# Patient Record
Sex: FEMALE | Race: WHITE | NOT HISPANIC OR LATINO | URBAN - METROPOLITAN AREA
[De-identification: names, ages, dates, MRNs, and addresses within clinical notes are randomized per-mention and may not be internally consistent; named-entity substitution may affect disease eponyms.]

---

## 2019-07-10 ENCOUNTER — INPATIENT (INPATIENT)
Facility: HOSPITAL | Age: 84
LOS: 2 days | Discharge: HOME | End: 2019-07-13
Attending: INTERNAL MEDICINE | Admitting: INTERNAL MEDICINE
Payer: MEDICARE

## 2019-07-10 VITALS
DIASTOLIC BLOOD PRESSURE: 57 MMHG | RESPIRATION RATE: 20 BRPM | TEMPERATURE: 98 F | OXYGEN SATURATION: 93 % | SYSTOLIC BLOOD PRESSURE: 92 MMHG | HEART RATE: 100 BPM

## 2019-07-10 DIAGNOSIS — Z90.710 ACQUIRED ABSENCE OF BOTH CERVIX AND UTERUS: Chronic | ICD-10-CM

## 2019-07-10 DIAGNOSIS — Z90.49 ACQUIRED ABSENCE OF OTHER SPECIFIED PARTS OF DIGESTIVE TRACT: Chronic | ICD-10-CM

## 2019-07-10 DIAGNOSIS — Z85.828 PERSONAL HISTORY OF OTHER MALIGNANT NEOPLASM OF SKIN: Chronic | ICD-10-CM

## 2019-07-10 LAB
ALBUMIN SERPL ELPH-MCNC: 4.1 G/DL — SIGNIFICANT CHANGE UP (ref 3.5–5.2)
ALP SERPL-CCNC: 68 U/L — SIGNIFICANT CHANGE UP (ref 30–115)
ALT FLD-CCNC: 21 U/L — SIGNIFICANT CHANGE UP (ref 0–41)
ANION GAP SERPL CALC-SCNC: 15 MMOL/L — HIGH (ref 7–14)
APPEARANCE UR: ABNORMAL
APTT BLD: 33.9 SEC — SIGNIFICANT CHANGE UP (ref 27–39.2)
AST SERPL-CCNC: 19 U/L — SIGNIFICANT CHANGE UP (ref 0–41)
BACTERIA # UR AUTO: ABNORMAL
BASOPHILS # BLD AUTO: 0.07 K/UL — SIGNIFICANT CHANGE UP (ref 0–0.2)
BASOPHILS NFR BLD AUTO: 0.8 % — SIGNIFICANT CHANGE UP (ref 0–1)
BILIRUB SERPL-MCNC: 0.3 MG/DL — SIGNIFICANT CHANGE UP (ref 0.2–1.2)
BILIRUB UR-MCNC: NEGATIVE — SIGNIFICANT CHANGE UP
BLD GP AB SCN SERPL QL: SIGNIFICANT CHANGE UP
BUN SERPL-MCNC: 39 MG/DL — HIGH (ref 10–20)
CALCIUM SERPL-MCNC: 9.6 MG/DL — SIGNIFICANT CHANGE UP (ref 8.5–10.1)
CHLORIDE SERPL-SCNC: 102 MMOL/L — SIGNIFICANT CHANGE UP (ref 98–110)
CO2 SERPL-SCNC: 25 MMOL/L — SIGNIFICANT CHANGE UP (ref 17–32)
COLOR SPEC: YELLOW — SIGNIFICANT CHANGE UP
CREAT SERPL-MCNC: 1.7 MG/DL — HIGH (ref 0.7–1.5)
DIFF PNL FLD: SIGNIFICANT CHANGE UP
EOSINOPHIL # BLD AUTO: 0.13 K/UL — SIGNIFICANT CHANGE UP (ref 0–0.7)
EOSINOPHIL NFR BLD AUTO: 1.4 % — SIGNIFICANT CHANGE UP (ref 0–8)
EPI CELLS # UR: 3 /HPF — SIGNIFICANT CHANGE UP (ref 0–5)
GLUCOSE SERPL-MCNC: 118 MG/DL — HIGH (ref 70–99)
GLUCOSE UR QL: NEGATIVE — SIGNIFICANT CHANGE UP
HCT VFR BLD CALC: 39.5 % — SIGNIFICANT CHANGE UP (ref 37–47)
HGB BLD-MCNC: 13 G/DL — SIGNIFICANT CHANGE UP (ref 12–16)
HYALINE CASTS # UR AUTO: 0 /LPF — SIGNIFICANT CHANGE UP (ref 0–7)
IMM GRANULOCYTES NFR BLD AUTO: 0.4 % — HIGH (ref 0.1–0.3)
INR BLD: 1 RATIO — SIGNIFICANT CHANGE UP (ref 0.65–1.3)
KETONES UR-MCNC: NEGATIVE — SIGNIFICANT CHANGE UP
LEUKOCYTE ESTERASE UR-ACNC: ABNORMAL
LYMPHOCYTES # BLD AUTO: 1.02 K/UL — LOW (ref 1.2–3.4)
LYMPHOCYTES # BLD AUTO: 11.1 % — LOW (ref 20.5–51.1)
MAGNESIUM SERPL-MCNC: 2 MG/DL — SIGNIFICANT CHANGE UP (ref 1.8–2.4)
MCHC RBC-ENTMCNC: 29.1 PG — SIGNIFICANT CHANGE UP (ref 27–31)
MCHC RBC-ENTMCNC: 32.9 G/DL — SIGNIFICANT CHANGE UP (ref 32–37)
MCV RBC AUTO: 88.4 FL — SIGNIFICANT CHANGE UP (ref 81–99)
MONOCYTES # BLD AUTO: 0.59 K/UL — SIGNIFICANT CHANGE UP (ref 0.1–0.6)
MONOCYTES NFR BLD AUTO: 6.4 % — SIGNIFICANT CHANGE UP (ref 1.7–9.3)
NEUTROPHILS # BLD AUTO: 7.35 K/UL — HIGH (ref 1.4–6.5)
NEUTROPHILS NFR BLD AUTO: 79.9 % — HIGH (ref 42.2–75.2)
NITRITE UR-MCNC: POSITIVE
NRBC # BLD: 0 /100 WBCS — SIGNIFICANT CHANGE UP (ref 0–0)
PH UR: 6.5 — SIGNIFICANT CHANGE UP (ref 5–8)
PLATELET # BLD AUTO: 274 K/UL — SIGNIFICANT CHANGE UP (ref 130–400)
POTASSIUM SERPL-MCNC: 3.4 MMOL/L — LOW (ref 3.5–5)
POTASSIUM SERPL-SCNC: 3.4 MMOL/L — LOW (ref 3.5–5)
PROT SERPL-MCNC: 6.7 G/DL — SIGNIFICANT CHANGE UP (ref 6–8)
PROT UR-MCNC: SIGNIFICANT CHANGE UP
PROTHROM AB SERPL-ACNC: 11.5 SEC — SIGNIFICANT CHANGE UP (ref 9.95–12.87)
RBC # BLD: 4.47 M/UL — SIGNIFICANT CHANGE UP (ref 4.2–5.4)
RBC # FLD: 13.2 % — SIGNIFICANT CHANGE UP (ref 11.5–14.5)
RBC CASTS # UR COMP ASSIST: 5 /HPF — HIGH (ref 0–4)
SODIUM SERPL-SCNC: 142 MMOL/L — SIGNIFICANT CHANGE UP (ref 135–146)
SP GR SPEC: 1.01 — SIGNIFICANT CHANGE UP (ref 1.01–1.02)
TROPONIN T SERPL-MCNC: <0.01 NG/ML — SIGNIFICANT CHANGE UP
UROBILINOGEN FLD QL: SIGNIFICANT CHANGE UP
WBC # BLD: 9.2 K/UL — SIGNIFICANT CHANGE UP (ref 4.8–10.8)
WBC # FLD AUTO: 9.2 K/UL — SIGNIFICANT CHANGE UP (ref 4.8–10.8)
WBC UR QL: 301 /HPF — HIGH (ref 0–5)

## 2019-07-10 PROCEDURE — 70450 CT HEAD/BRAIN W/O DYE: CPT | Mod: 26

## 2019-07-10 PROCEDURE — 72125 CT NECK SPINE W/O DYE: CPT | Mod: 26

## 2019-07-10 PROCEDURE — 72170 X-RAY EXAM OF PELVIS: CPT | Mod: 26

## 2019-07-10 PROCEDURE — 71045 X-RAY EXAM CHEST 1 VIEW: CPT | Mod: 26

## 2019-07-10 PROCEDURE — 99285 EMERGENCY DEPT VISIT HI MDM: CPT

## 2019-07-10 PROCEDURE — 93010 ELECTROCARDIOGRAM REPORT: CPT

## 2019-07-10 RX ORDER — LEVOTHYROXINE SODIUM 125 MCG
125 TABLET ORAL
Qty: 0 | Refills: 0 | DISCHARGE

## 2019-07-10 RX ORDER — LEVOTHYROXINE SODIUM 125 MCG
0 TABLET ORAL
Qty: 0 | Refills: 0 | DISCHARGE

## 2019-07-10 RX ORDER — CEFTRIAXONE 500 MG/1
1000 INJECTION, POWDER, FOR SOLUTION INTRAMUSCULAR; INTRAVENOUS ONCE
Refills: 0 | Status: COMPLETED | OUTPATIENT
Start: 2019-07-10 | End: 2019-07-10

## 2019-07-10 RX ORDER — SODIUM CHLORIDE 9 MG/ML
1000 INJECTION, SOLUTION INTRAVENOUS ONCE
Refills: 0 | Status: COMPLETED | OUTPATIENT
Start: 2019-07-10 | End: 2019-07-10

## 2019-07-10 RX ADMIN — SODIUM CHLORIDE 1000 MILLILITER(S): 9 INJECTION, SOLUTION INTRAVENOUS at 23:00

## 2019-07-10 RX ADMIN — CEFTRIAXONE 100 MILLIGRAM(S): 500 INJECTION, POWDER, FOR SOLUTION INTRAMUSCULAR; INTRAVENOUS at 23:32

## 2019-07-10 NOTE — ED ADULT NURSE NOTE - CHIEF COMPLAINT QUOTE
pt fell in her house , hit her head on the stove , pt was feeling dizzy, no loc , pt not on blood thinners ua collected, to lab

## 2019-07-10 NOTE — ED PROVIDER NOTE - PHYSICAL EXAMINATION
Physical Exam    Vital Signs: I have reviewed the initial vital signs  Constitutional: well-nourished, appears stated age, no acute distress  EENT: Normocephalic, atraumatic. Conjunctiva pink, Sclera clear, PERRLA, EOMI. Mucous membranes moist, no exudates or lesions noted, uvula midline.   Cardiovascular: S1 and S2 present, regular rate, regular rhythm. Well perfused extremities, no peripheral edema  Respiratory: unlabored respiratory effort, clear to auscultation bilaterally no wheezing, rales and rhonchi  Gastrointestinal: soft, non-tender abdomen  Musculoskeletal: supple nontender neck, no midline tenderness, no joint pain. no tenderness with hip and rib compression  Integumentary: warm, dry, no rash  Neurologic: A & O x 3, CN II-XII grossly intact, all extremities’ motor and sensory functions grossly intact  Psychiatric: appropriate mood, appropriate affect

## 2019-07-10 NOTE — ED PROVIDER NOTE - OBJECTIVE STATEMENT
86 year old female hx COPD, HTN, hypothyroid presents after witnessed syncopal episode 1.5 hours ago. Patient was walkign to sink in kitchen when she felt dizzy and fell backwards onto her head. She denies bloodthinners, LOC, witnessed by granddaughter, ambulating afterwards. Denies chest pain, visual changes, shortness of breath, cough, fever, chills, abd pain, n/v, dysuria. Patient had syncope last week and had outpatient MRI done a few days ago. Patient does admit to having visual hallucinations of people who aren't there for months. No auditory hallucinations. no hx of dementia.

## 2019-07-10 NOTE — ED PROVIDER NOTE - CLINICAL SUMMARY MEDICAL DECISION MAKING FREE TEXT BOX
Patient presented s/p syncopal episode with pre-syncope dizziness while ambulating. On arrival patient asymptomatic, afebrile, HD stable, neurovascular intact. Obtained CT head and c-spine which were negative. CXR and pelvic xray negative as well. EKG showed non-specific changes but troponin negative. UA showed (+) UTI. Given syncopal episode and age, patient not obs candidate and will require admission for further syncopal work up. Tx in ED with IV abx for UTI. Patient agreeable with plan. HD stable at time of admission.

## 2019-07-10 NOTE — ED ADULT NURSE NOTE - NSIMPLEMENTINTERV_GEN_ALL_ED
Implemented All Fall with Harm Risk Interventions:  [place] to call system. Call bell, personal items and telephone within reach. Instruct patient to call for assistance. Room bathroom lighting operational. Non-slip footwear when patient is off stretcher. Physically safe environment: no spills, clutter or unnecessary equipment. Stretcher in lowest position, wheels locked, appropriate side rails in place. Provide visual cue, wrist band, yellow gown, etc. Monitor gait and stability. Monitor for mental status changes and reorient to person, place, and time. Review medications for side effects contributing to fall risk. Reinforce activity limits and safety measures with patient and family. Provide visual clues: red socks.

## 2019-07-10 NOTE — ED PROVIDER NOTE - NS ED ROS FT
Review of Systems         Constitutional: (-) fever (-) chills (-) weakness       Head: (+) trauma       EENT: (-) visual changes (-) sore throat       Cardiovascular: (-) chest pain (-) syncope       Respiratory: (-) cough, (-) shortness of breath       Gastrointestinal: (-) abdominal pain (-) vomiting (-) diarrhea (-) nausea       Genitourinary: (-) dysuria        Musculoskeletal: (-) neck pain (-) back pain (-) joint pain       Integumentary: (-) rash       Neurological: (-) headache (-) altered mental status (-) dizziness (-) paresthesias       Psych: (-) psych history

## 2019-07-10 NOTE — ED ADULT NURSE NOTE - OBJECTIVE STATEMENT
Patient states she became dizzy and fell, denies LOC, states she hit back of head, denies blood thinners

## 2019-07-10 NOTE — ED ADULT TRIAGE NOTE - CHIEF COMPLAINT QUOTE
pt fell in her house , hit her head on the stove , pt was feeling dizzy, no loc , pt not on blood thinners

## 2019-07-10 NOTE — ED PROVIDER NOTE - ATTENDING CONTRIBUTION TO CARE
I personally evaluated the patient. I reviewed the Resident’s and Physician Assistant’s note (as assigned above), and agree with the findings and plan except as documented in my note.    86 year old female, pmhx COPD, HTN, hypothyroidism, presenting s/p syncopal episode 1.5 hours PTA. Patient states she was walking to the sink in the kitchen at which point she felt suddenly lightheaded and fell backwards, hitting her head. Denies full LOC. States she was able to ambulate since the incident, and denies any blood thinners or any other traumatic injury. At this time complaining of head pain described as diffuse, achy, non-radiating, no palliative or provocative factors, moderate severity.     Vital Signs: I have reviewed the initial vital signs.  Constitutional: NAD, well-nourished, appears stated age, no acute distress.  HEENT: Airway patent, moist MM, no erythema/swelling/deformity of oral structures. EOMI, PERRLA.  CV: regular rate, regular rhythm, well-perfused extremities, 2+ b/l DP and radial pulses equal.  Lungs: BCTA, no increased WOB.  ABD: NTND, no guarding or rebound, no pulsatile mass, no hernias.   MSK: Neck supple, nontender, nl ROM, no stepoff. Chest nontender. Back nontender in TLS spine or to b/l bony structures or flanks. Ext nontender, nl rom, no deformity.   INTEG: Skin warm, dry, no rash.  NEURO: A&Ox3, normal strength, nl sensation throughout, normal speech.   PSYCH: Calm, cooperative, normal affect and interaction.

## 2019-07-11 LAB
24R-OH-CALCIDIOL SERPL-MCNC: 21 NG/ML — LOW (ref 30–80)
ALBUMIN SERPL ELPH-MCNC: 3.7 G/DL — SIGNIFICANT CHANGE UP (ref 3.5–5.2)
ALP SERPL-CCNC: 67 U/L — SIGNIFICANT CHANGE UP (ref 30–115)
ALT FLD-CCNC: 18 U/L — SIGNIFICANT CHANGE UP (ref 0–41)
ANION GAP SERPL CALC-SCNC: 12 MMOL/L — SIGNIFICANT CHANGE UP (ref 7–14)
AST SERPL-CCNC: 17 U/L — SIGNIFICANT CHANGE UP (ref 0–41)
BILIRUB SERPL-MCNC: 0.3 MG/DL — SIGNIFICANT CHANGE UP (ref 0.2–1.2)
BUN SERPL-MCNC: 38 MG/DL — HIGH (ref 10–20)
CALCIUM SERPL-MCNC: 9.3 MG/DL — SIGNIFICANT CHANGE UP (ref 8.5–10.1)
CHLORIDE SERPL-SCNC: 103 MMOL/L — SIGNIFICANT CHANGE UP (ref 98–110)
CO2 SERPL-SCNC: 28 MMOL/L — SIGNIFICANT CHANGE UP (ref 17–32)
CREAT SERPL-MCNC: 1.5 MG/DL — SIGNIFICANT CHANGE UP (ref 0.7–1.5)
GLUCOSE SERPL-MCNC: 100 MG/DL — HIGH (ref 70–99)
HCT VFR BLD CALC: 39.2 % — SIGNIFICANT CHANGE UP (ref 37–47)
HGB BLD-MCNC: 12.5 G/DL — SIGNIFICANT CHANGE UP (ref 12–16)
MCHC RBC-ENTMCNC: 28.9 PG — SIGNIFICANT CHANGE UP (ref 27–31)
MCHC RBC-ENTMCNC: 31.9 G/DL — LOW (ref 32–37)
MCV RBC AUTO: 90.7 FL — SIGNIFICANT CHANGE UP (ref 81–99)
NRBC # BLD: 0 /100 WBCS — SIGNIFICANT CHANGE UP (ref 0–0)
PLATELET # BLD AUTO: 220 K/UL — SIGNIFICANT CHANGE UP (ref 130–400)
POTASSIUM SERPL-MCNC: 3.4 MMOL/L — LOW (ref 3.5–5)
POTASSIUM SERPL-SCNC: 3.4 MMOL/L — LOW (ref 3.5–5)
PROT SERPL-MCNC: 6.3 G/DL — SIGNIFICANT CHANGE UP (ref 6–8)
RBC # BLD: 4.32 M/UL — SIGNIFICANT CHANGE UP (ref 4.2–5.4)
RBC # FLD: 13.2 % — SIGNIFICANT CHANGE UP (ref 11.5–14.5)
SODIUM SERPL-SCNC: 143 MMOL/L — SIGNIFICANT CHANGE UP (ref 135–146)
WBC # BLD: 6.53 K/UL — SIGNIFICANT CHANGE UP (ref 4.8–10.8)
WBC # FLD AUTO: 6.53 K/UL — SIGNIFICANT CHANGE UP (ref 4.8–10.8)

## 2019-07-11 PROCEDURE — 99223 1ST HOSP IP/OBS HIGH 75: CPT | Mod: AI

## 2019-07-11 RX ORDER — LEVOTHYROXINE SODIUM 125 MCG
125 TABLET ORAL DAILY
Refills: 0 | Status: DISCONTINUED | OUTPATIENT
Start: 2019-07-11 | End: 2019-07-13

## 2019-07-11 RX ORDER — CHOLECALCIFEROL (VITAMIN D3) 125 MCG
1 CAPSULE ORAL
Qty: 0 | Refills: 0 | DISCHARGE

## 2019-07-11 RX ORDER — CHOLECALCIFEROL (VITAMIN D3) 125 MCG
1000 CAPSULE ORAL DAILY
Refills: 0 | Status: DISCONTINUED | OUTPATIENT
Start: 2019-07-11 | End: 2019-07-13

## 2019-07-11 RX ORDER — CHLORHEXIDINE GLUCONATE 213 G/1000ML
1 SOLUTION TOPICAL
Refills: 0 | Status: DISCONTINUED | OUTPATIENT
Start: 2019-07-11 | End: 2019-07-13

## 2019-07-11 RX ORDER — HEPARIN SODIUM 5000 [USP'U]/ML
5000 INJECTION INTRAVENOUS; SUBCUTANEOUS EVERY 8 HOURS
Refills: 0 | Status: DISCONTINUED | OUTPATIENT
Start: 2019-07-11 | End: 2019-07-13

## 2019-07-11 RX ORDER — CEFTRIAXONE 500 MG/1
1000 INJECTION, POWDER, FOR SOLUTION INTRAMUSCULAR; INTRAVENOUS EVERY 24 HOURS
Refills: 0 | Status: DISCONTINUED | OUTPATIENT
Start: 2019-07-11 | End: 2019-07-13

## 2019-07-11 RX ORDER — SODIUM CHLORIDE 9 MG/ML
1000 INJECTION INTRAMUSCULAR; INTRAVENOUS; SUBCUTANEOUS
Refills: 0 | Status: DISCONTINUED | OUTPATIENT
Start: 2019-07-11 | End: 2019-07-13

## 2019-07-11 RX ADMIN — Medication 1000 UNIT(S): at 11:36

## 2019-07-11 RX ADMIN — HEPARIN SODIUM 5000 UNIT(S): 5000 INJECTION INTRAVENOUS; SUBCUTANEOUS at 13:46

## 2019-07-11 RX ADMIN — SODIUM CHLORIDE 75 MILLILITER(S): 9 INJECTION INTRAMUSCULAR; INTRAVENOUS; SUBCUTANEOUS at 07:29

## 2019-07-11 RX ADMIN — Medication 125 MICROGRAM(S): at 07:33

## 2019-07-11 RX ADMIN — SODIUM CHLORIDE 75 MILLILITER(S): 9 INJECTION INTRAMUSCULAR; INTRAVENOUS; SUBCUTANEOUS at 19:35

## 2019-07-11 RX ADMIN — CEFTRIAXONE 100 MILLIGRAM(S): 500 INJECTION, POWDER, FOR SOLUTION INTRAMUSCULAR; INTRAVENOUS at 22:56

## 2019-07-11 RX ADMIN — HEPARIN SODIUM 5000 UNIT(S): 5000 INJECTION INTRAVENOUS; SUBCUTANEOUS at 21:54

## 2019-07-11 NOTE — H&P ADULT - ATTENDING COMMENTS
#Progress Note Handoff    Pending (specify):  NEAR SYNCOPE 2NDARY TO THE DEHYDRATION/ HYPOVOLEMIA. Tele monitoring for 24 hours   UTI/ Cystitis - c/w ceftriaxone and follow urine culture.     Family discussion: D/W Son   Disposition: Home

## 2019-07-11 NOTE — H&P ADULT - NSHPLABSRESULTS_GEN_ALL_CORE
13.0   9.20  )-----------( 274      ( 10 Jul 2019 18:00 )             39.5       07-10    142  |  102  |  39<H>  ----------------------------<  118<H>  3.4<L>   |  25  |  1.7<H>    Ca    9.6      10 Jul 2019 18:00  Mg     2.0     07-10    TPro  6.7  /  Alb  4.1  /  TBili  0.3  /  DBili  x   /  AST  19  /  ALT  21  /  AlkPhos  68  07-10       EKG: NSR@96 w/ L axis deviation      RADIOLOGY:    IMPRESSION:    No acute fracture or significant subluxation of the cervical spine.    < end of copied text >        < from: Xray Pelvis AP only (07.10.19 @ 18:52) >    impression: No acutely displaced pelvic ring fracture. 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 Mild bilateral   sacroiliac joint degenerative change. Vascular calcifications present.    < end of copied text >          < from: CT Head No Cont (07.10.19 @ 19:49) >      IMPRESSION:    No CT evidence for acute intracranial pathology.    < end of copied text >

## 2019-07-11 NOTE — H&P ADULT - NSHPPHYSICALEXAM_GEN_ALL_CORE
ICU Vital Signs Last 24 Hrs  T(C): 36.7 (10 Jul 2019 17:16), Max: 36.7 (10 Jul 2019 17:16)  T(F): 98 (10 Jul 2019 17:16), Max: 98 (10 Jul 2019 17:16)  HR: 89 (10 Jul 2019 17:55) (89 - 100)  BP: 96/54 (10 Jul 2019 17:55) (92/57 - 96/54)  BP(mean): --  ABP: --  ABP(mean): --  RR: 19 (10 Jul 2019 17:55) (19 - 20)  SpO2: 99% (10 Jul 2019 17:55) (93% - 99%)      PHYSICAL EXAM:    General: Not in distress.  Well-developed, speaks in full sentences. AAOx3  HEENT: Atraumatic, normocephalic. Moist mucus membranes. PERRLA.  Cardio: Regular rate and rhythm. S1, S2 appreciated. no murmurs.  Pulm: Bilateral breath sounds. No wheezing, or rhonchi  Abdomen: Soft, non-tender, non-distended. Normoactive bowel sounds.  Extremities: No cyanosis or edema bilaterally. No calf tenderness to palpation.  Neuro: No focal deficits. Motor 5/5 throughout. Sensation intact

## 2019-07-11 NOTE — H&P ADULT - ASSESSMENT
87 y/o female w/ past medical history of  COPD, hypertension, hypothyroidism presenting with chief complaint of near syncope.    #Near syncope secondary to orthostatic hypotension secondary to dehydration  - /84 supine --> 76/44 standing. Pt admits to decreased PO fluid intake at home over the last couple of weeks.  - Admit to telemetry for 24 hours for observation. Etiology unlikely cardiac. EKG: NSR@96 w/ L axis deviation  - Seizure unlikely given history. No indication for EEG  - CTH -ve for acute intracranial pathology  - Repeat orthostatics  - D/C anti-hypertensives (Triamterene-HCTZ 25/37.5mg).  - Compression stockings. NS 0.9% at 75cc/hr.  - TSH, Vitamin D, B12, and folate ordered.  - Pt educated about importance of increasing hydration and taking her time on standing up from seated position.    #Acute cystitis  - U/A significantly positive with many bacteria, wbc of 301, positive nitrites, and large leukocyte esterase. Pt endorses "some" frequency.  - Rocephin [id number]. F/u urine culture  - Hypotension and mild tachycardia likely dehydration induced rather than infection-driven. Both improved on IVF gentle hydration.    #Acute Kidney injury  - Cr 1.7 on admission. No history of CKD. No baseline Cr available.  - likely pre-renal in etiology.   - Monitor BMP daily on IVFs    #Hypertension  - Blood pressure controlled/hypotensive on presentation.  - D/C anti-hypertensives (Triamterene-HCTZ 25/37.5mg) and continue to monitor.    #Hypothyroidism  -C/w synthroid 125mcg    #suspected vit D deficiency  -C/w vit D supplements    #COPD - stable  - No wheezing on physical exam. No increased cough or sputum production. CXR stable    Diet: DASH  DVT ppx: Heparin SQ  Activity: ambulate as tolerated    #Dispo: Telemetry    #Code Status: Full

## 2019-07-11 NOTE — H&P ADULT - HISTORY OF PRESENT ILLNESS
87 y/o female w/ past medical history of  COPD, hypertension, hypothyroidism presenting with chief complaint of near syncope.    History goes back to yesterday afternoon when pt was getting up from her chair, felt dizzy, lightheaded, then lost her balance and fell on the floor. She hit her head on the stove, but denies any loss of consciousness She denies any other preceding aura, vision changes, confusion, tongue biting, tonic-clonic movements, bowel or urinary incontinence, or palpitations. Her fall was witnessed by her daughter who helped her up and called 911. Pt had a similar episode last week under similar circumstances without LOC and was told by her PMD to present to the ED if the episode recurs.   She denies any dysuria, urgency or frequency.    In the ED, VS: BP 92/57  T98 RR 20 satting 93%. U/A was +ve. She received Ceftriaxone 1g q24hr, and 1L LR.

## 2019-07-11 NOTE — PATIENT PROFILE ADULT - FUNCTIONAL SCREEN CURRENT LEVEL: COMMUNICATION, MLM
PGY 1 Note discussed with supervising resident and primary attending    Patient is a 89y old  Female who presents with a chief complaint of anemia (25 Aug 2018 16:47)      INTERVAL HPI/OVERNIGHT EVENTS: mentation has improved since last Friday when she became acutely lethargic; awake and alert, oriented to self and time, follows commands, understands basic questions and answers; remains afebrile and HD stable    MEDICATIONS  (STANDING):  donepezil 10 milliGRAM(s) Oral at bedtime  furosemide   Injectable 20 milliGRAM(s) IV Push once  pantoprazole  Injectable 40 milliGRAM(s) IV Push two times a day    MEDICATIONS  (PRN):  acetaminophen    Suspension. 650 milliGRAM(s) Oral every 6 hours PRN Moderate Pain (4 - 6)  glucagon  Injectable 1 milliGRAM(s) IntraMuscular once PRN Glucose LESS THAN 70 milligrams/deciliter      __________________________________________________  REVIEW OF SYSTEMS:    CONSTITUTIONAL: No fever,   EYES: no acute visual disturbances  NECK: No pain or stiffness  RESPIRATORY: No cough; No shortness of breath  CARDIOVASCULAR: No chest pain, no palpitations  GASTROINTESTINAL: No pain. No nausea or vomiting; No diarrhea   NEUROLOGICAL: No headache or numbness, no tremors  MUSCULOSKELETAL: No joint pain, no muscle pain  GENITOURINARY: no dysuria, no frequency, no hesitancy  PSYCHIATRY: no depression , no anxiety  ALL OTHER  ROS negative        Vital Signs Last 24 Hrs  T(C): 36.8 (03 Sep 2018 05:30), Max: 36.8 (03 Sep 2018 05:30)  T(F): 98.3 (03 Sep 2018 05:30), Max: 98.3 (03 Sep 2018 05:30)  HR: 100 (03 Sep 2018 05:30) (84 - 100)  BP: 146/78 (03 Sep 2018 05:30) (128/55 - 146/78)  BP(mean): --  RR: 17 (03 Sep 2018 05:30) (16 - 17)  SpO2: 100% (03 Sep 2018 05:30) (96% - 100%)    ________________________________________________  PHYSICAL EXAM:  GENERAL: NAD  HEENT: Normocephalic;  conjunctivae and sclerae clear; moist mucous membranes;   NECK : supple  CHEST/LUNG: Clear to auscultation bilaterally with good air entry   HEART: S1 S2  regular; no murmurs, gallops or rubs  ABDOMEN: Soft, Nontender, Nondistended; Bowel sounds present  EXTREMITIES: no cyanosis; no edema; no calf tenderness  SKIN: warm and dry; no rash  NERVOUS SYSTEM:  Awake and alert; Oriented  to place, person and time ; no new deficits    _________________________________________________  LABS:                        8.7    9.6   )-----------( 509      ( 03 Sep 2018 10:30 )             28.3     09-03    138  |  104  |  12  ----------------------------<  105<H>  4.2   |  26  |  0.96    Ca    8.8      03 Sep 2018 06:31  Phos  3.4     09-02  Mg     1.8     09-02          CAPILLARY BLOOD GLUCOSE      POCT Blood Glucose.: 111 mg/dL (03 Sep 2018 08:15)        RADIOLOGY & ADDITIONAL TESTS:    Imaging Personally Reviewed:  YES    Consultant(s) Notes Reviewed:   YES    Care Discussed with Consultants :     Plan of care was discussed with patient and /or primary care giver; all questions and concerns were addressed and care was aligned with patient's wishes. PGY 1 Note discussed with supervising resident and primary attending    Patient is a 89y old  Female who presents with a chief complaint of anemia (25 Aug 2018 16:47)      INTERVAL HPI/OVERNIGHT EVENTS: mentation has improved since last Friday when she became acutely lethargic; awake and alert, oriented to self and time, follows commands, understands basic questions and answers; remains afebrile and HD stable; Unasyn is d/c per improving Lt arm swelling, redness, warmth, and pain    MEDICATIONS  (STANDING):  donepezil 10 milliGRAM(s) Oral at bedtime  furosemide   Injectable 20 milliGRAM(s) IV Push once  pantoprazole  Injectable 40 milliGRAM(s) IV Push two times a day    MEDICATIONS  (PRN):  acetaminophen    Suspension. 650 milliGRAM(s) Oral every 6 hours PRN Moderate Pain (4 - 6)  glucagon  Injectable 1 milliGRAM(s) IntraMuscular once PRN Glucose LESS THAN 70 milligrams/deciliter      __________________________________________________  REVIEW OF SYSTEMS:    CONSTITUTIONAL: No fever,   NECK: No pain or stiffness  RESPIRATORY: No cough;   CARDIOVASCULAR: No chest pain, no palpitations  GASTROINTESTINAL: No nausea or vomiting; No diarrhea   NEUROLOGICAL: No headache or numbness, no tremors      Vital Signs Last 24 Hrs  T(C): 36.8 (03 Sep 2018 05:30), Max: 36.8 (03 Sep 2018 05:30)  T(F): 98.3 (03 Sep 2018 05:30), Max: 98.3 (03 Sep 2018 05:30)  HR: 100 (03 Sep 2018 05:30) (84 - 100)  BP: 146/78 (03 Sep 2018 05:30) (128/55 - 146/78)  BP(mean): --  RR: 17 (03 Sep 2018 05:30) (16 - 17)  SpO2: 100% (03 Sep 2018 05:30) (96% - 100%)    ________________________________________________  PHYSICAL EXAM:    GENERAL: NAD  HEENT: Normocephalic;  conjunctivae and sclerae clear; moist mucous membranes;   NECK : supple  CHEST/LUNG: Clear to auscultation bilaterally with good air entry   HEART: S1 S2  regular; no murmurs, gallops or rubs  ABDOMEN: Soft, obese, Nontender, slightly distended; Bowel sounds present  EXTREMITIES: edemas present in UE b/l    _________________________________________________  LABS:                        8.7    9.6   )-----------( 509      ( 03 Sep 2018 10:30 )             28.3     09-03    138  |  104  |  12  ----------------------------<  105<H>  4.2   |  26  |  0.96    Ca    8.8      03 Sep 2018 06:31  Phos  3.4     09-02  Mg     1.8     09-02          CAPILLARY BLOOD GLUCOSE      POCT Blood Glucose.: 111 mg/dL (03 Sep 2018 08:15)        RADIOLOGY & ADDITIONAL TESTS:    Imaging Personally Reviewed:  YES    Consultant(s) Notes Reviewed:   YES    Care Discussed with Consultants :     Plan of care was discussed with patient and /or primary care giver; all questions and concerns were addressed and care was aligned with patient's wishes. PGY 1 Note discussed with supervising resident and primary attending    Patient is a 89y old  Female who presents with a chief complaint of anemia (25 Aug 2018 16:47)      INTERVAL HPI/OVERNIGHT EVENTS: mentation has improved since last Friday when she became acutely lethargic; awake and alert, oriented to self and time, follows commands, understands basic questions and answers; remains afebrile and HD stable; Unasyn is d/c per improving Lt arm swelling, redness, warmth, and pain    MEDICATIONS  (STANDING):  donepezil 10 milliGRAM(s) Oral at bedtime  furosemide   Injectable 20 milliGRAM(s) IV Push once  pantoprazole  Injectable 40 milliGRAM(s) IV Push two times a day    MEDICATIONS  (PRN):  acetaminophen    Suspension. 650 milliGRAM(s) Oral every 6 hours PRN Moderate Pain (4 - 6)  glucagon  Injectable 1 milliGRAM(s) IntraMuscular once PRN Glucose LESS THAN 70 milligrams/deciliter      __________________________________________________  REVIEW OF SYSTEMS:    CONSTITUTIONAL: No fever,   NECK: No pain or stiffness  RESPIRATORY: No cough;   CARDIOVASCULAR: No chest pain, no palpitations  GASTROINTESTINAL: No nausea or vomiting; No diarrhea   NEUROLOGICAL: No headache or numbness, no tremors      Vital Signs Last 24 Hrs  T(C): 36.8 (03 Sep 2018 05:30), Max: 36.8 (03 Sep 2018 05:30)  T(F): 98.3 (03 Sep 2018 05:30), Max: 98.3 (03 Sep 2018 05:30)  HR: 100 (03 Sep 2018 05:30) (84 - 100)  BP: 146/78 (03 Sep 2018 05:30) (128/55 - 146/78)  BP(mean): --  RR: 17 (03 Sep 2018 05:30) (16 - 17)  SpO2: 100% (03 Sep 2018 05:30) (96% - 100%)    ________________________________________________  PHYSICAL EXAM:    GENERAL: NAD  HEENT: Normocephalic;  conjunctivae and sclerae clear; moist mucous membranes;   NECK : supple  CHEST/LUNG: Clear to auscultation bilaterally with good air entry   HEART: S1 S2  regular; no murmurs, gallops or rubs  ABDOMEN: Soft, obese, Nontender, slightly distended; Bowel sounds present  EXTREMITIES: edemas present in UE b/l  NEURO: alert, awake, disoriented, no focal deficits    _________________________________________________  LABS:                        8.7    9.6   )-----------( 509      ( 03 Sep 2018 10:30 )             28.3     09-03    138  |  104  |  12  ----------------------------<  105<H>  4.2   |  26  |  0.96    Ca    8.8      03 Sep 2018 06:31  Phos  3.4     09-02  Mg     1.8     09-02          CAPILLARY BLOOD GLUCOSE      POCT Blood Glucose.: 111 mg/dL (03 Sep 2018 08:15)        RADIOLOGY & ADDITIONAL TESTS:    Imaging Personally Reviewed:  YES    Consultant(s) Notes Reviewed:   YES    Care Discussed with Consultants :     Plan of care was discussed with patient and /or primary care giver; all questions and concerns were addressed and care was aligned with patient's wishes. 0 = understands/communicates without difficulty

## 2019-07-11 NOTE — H&P ADULT - NSICDXPASTMEDICALHX_GEN_ALL_CORE_FT
PAST MEDICAL HISTORY:  COPD (chronic obstructive pulmonary disease)     HTN (hypertension)     Hypothyroid

## 2019-07-11 NOTE — H&P ADULT - NSICDXPASTSURGICALHX_GEN_ALL_CORE_FT
PAST SURGICAL HISTORY:  H/O total hysterectomy     History of cholecystectomy     History of skin cancer

## 2019-07-12 LAB
ANION GAP SERPL CALC-SCNC: 15 MMOL/L — HIGH (ref 7–14)
BASOPHILS # BLD AUTO: 0.03 K/UL — SIGNIFICANT CHANGE UP (ref 0–0.2)
BASOPHILS NFR BLD AUTO: 0.6 % — SIGNIFICANT CHANGE UP (ref 0–1)
BUN SERPL-MCNC: 32 MG/DL — HIGH (ref 10–20)
CALCIUM SERPL-MCNC: 8.9 MG/DL — SIGNIFICANT CHANGE UP (ref 8.5–10.1)
CHLORIDE SERPL-SCNC: 104 MMOL/L — SIGNIFICANT CHANGE UP (ref 98–110)
CO2 SERPL-SCNC: 23 MMOL/L — SIGNIFICANT CHANGE UP (ref 17–32)
CREAT SERPL-MCNC: 1.5 MG/DL — SIGNIFICANT CHANGE UP (ref 0.7–1.5)
EOSINOPHIL # BLD AUTO: 0.13 K/UL — SIGNIFICANT CHANGE UP (ref 0–0.7)
EOSINOPHIL NFR BLD AUTO: 2.4 % — SIGNIFICANT CHANGE UP (ref 0–8)
FOLATE SERPL-MCNC: 8.3 NG/ML — SIGNIFICANT CHANGE UP
GLUCOSE SERPL-MCNC: 132 MG/DL — HIGH (ref 70–99)
HCT VFR BLD CALC: 38.7 % — SIGNIFICANT CHANGE UP (ref 37–47)
HGB BLD-MCNC: 12.5 G/DL — SIGNIFICANT CHANGE UP (ref 12–16)
IMM GRANULOCYTES NFR BLD AUTO: 0.4 % — HIGH (ref 0.1–0.3)
LYMPHOCYTES # BLD AUTO: 1 K/UL — LOW (ref 1.2–3.4)
LYMPHOCYTES # BLD AUTO: 18.6 % — LOW (ref 20.5–51.1)
MAGNESIUM SERPL-MCNC: 1.8 MG/DL — SIGNIFICANT CHANGE UP (ref 1.8–2.4)
MCHC RBC-ENTMCNC: 29 PG — SIGNIFICANT CHANGE UP (ref 27–31)
MCHC RBC-ENTMCNC: 32.3 G/DL — SIGNIFICANT CHANGE UP (ref 32–37)
MCV RBC AUTO: 89.8 FL — SIGNIFICANT CHANGE UP (ref 81–99)
MONOCYTES # BLD AUTO: 0.38 K/UL — SIGNIFICANT CHANGE UP (ref 0.1–0.6)
MONOCYTES NFR BLD AUTO: 7.1 % — SIGNIFICANT CHANGE UP (ref 1.7–9.3)
NEUTROPHILS # BLD AUTO: 3.81 K/UL — SIGNIFICANT CHANGE UP (ref 1.4–6.5)
NEUTROPHILS NFR BLD AUTO: 70.9 % — SIGNIFICANT CHANGE UP (ref 42.2–75.2)
NRBC # BLD: 0 /100 WBCS — SIGNIFICANT CHANGE UP (ref 0–0)
PLATELET # BLD AUTO: 222 K/UL — SIGNIFICANT CHANGE UP (ref 130–400)
POTASSIUM SERPL-MCNC: 3.2 MMOL/L — LOW (ref 3.5–5)
POTASSIUM SERPL-SCNC: 3.2 MMOL/L — LOW (ref 3.5–5)
RBC # BLD: 4.31 M/UL — SIGNIFICANT CHANGE UP (ref 4.2–5.4)
RBC # FLD: 13.2 % — SIGNIFICANT CHANGE UP (ref 11.5–14.5)
SODIUM SERPL-SCNC: 142 MMOL/L — SIGNIFICANT CHANGE UP (ref 135–146)
TSH SERPL-MCNC: 0.25 UIU/ML — LOW (ref 0.27–4.2)
VIT B12 SERPL-MCNC: 383 PG/ML — SIGNIFICANT CHANGE UP (ref 232–1245)
WBC # BLD: 5.37 K/UL — SIGNIFICANT CHANGE UP (ref 4.8–10.8)
WBC # FLD AUTO: 5.37 K/UL — SIGNIFICANT CHANGE UP (ref 4.8–10.8)

## 2019-07-12 PROCEDURE — 99233 SBSQ HOSP IP/OBS HIGH 50: CPT

## 2019-07-12 RX ORDER — POTASSIUM CHLORIDE 20 MEQ
40 PACKET (EA) ORAL ONCE
Refills: 0 | Status: COMPLETED | OUTPATIENT
Start: 2019-07-12 | End: 2019-07-12

## 2019-07-12 RX ADMIN — SODIUM CHLORIDE 75 MILLILITER(S): 9 INJECTION INTRAMUSCULAR; INTRAVENOUS; SUBCUTANEOUS at 21:29

## 2019-07-12 RX ADMIN — CEFTRIAXONE 100 MILLIGRAM(S): 500 INJECTION, POWDER, FOR SOLUTION INTRAMUSCULAR; INTRAVENOUS at 22:36

## 2019-07-12 RX ADMIN — Medication 1000 UNIT(S): at 14:03

## 2019-07-12 RX ADMIN — HEPARIN SODIUM 5000 UNIT(S): 5000 INJECTION INTRAVENOUS; SUBCUTANEOUS at 06:22

## 2019-07-12 RX ADMIN — HEPARIN SODIUM 5000 UNIT(S): 5000 INJECTION INTRAVENOUS; SUBCUTANEOUS at 13:04

## 2019-07-12 RX ADMIN — Medication 125 MICROGRAM(S): at 06:22

## 2019-07-12 RX ADMIN — SODIUM CHLORIDE 75 MILLILITER(S): 9 INJECTION INTRAMUSCULAR; INTRAVENOUS; SUBCUTANEOUS at 13:06

## 2019-07-12 RX ADMIN — Medication 40 MILLIEQUIVALENT(S): at 08:01

## 2019-07-12 NOTE — PHYSICAL THERAPY INITIAL EVALUATION ADULT - LEVEL OF INDEPENDENCE: GAIT, REHAB EVAL
contact guard/pt ambulated 40ft RW CG with a lateral trunk lean to the left. pt visually impaired and its difficult for her to see obstacles. close CG with turns.

## 2019-07-12 NOTE — PROGRESS NOTE ADULT - ASSESSMENT
CATALINA COOK 86y Female  MRN#: [id number]   CODE STATUS: FULL      SUBJECTIVE  Patient is a 86y old Female who presents with a chief complaint of Near syncope (2019 05:30)    Currently admitted to medicine with the primary diagnosis of     Today is hospital day 2d, and this morning she is laying in bed comfortably and reports no overnight events.   Denies chest pain, shortness of breath, nausea, vomiting or changes in bowel habits.   has no complaints today.     Present Today:           Metz Catheter (x)No/ ()Yes?   Indication:             Central Line (x)No/ ()Yes?   Indication:          IV Fluids (x)No/ ()Yes? Type:  Rate:  Indication:    OBJECTIVE  PAST MEDICAL & SURGICAL HISTORY  Hypothyroid  COPD (chronic obstructive pulmonary disease)  HTN (hypertension)  History of skin cancer  H/O total hysterectomy  History of cholecystectomy    ALLERGIES:  No Known Allergies    HOME MEDICATIONS:  Home Medications:  Synthroid 125 mcg (0.125 mg) oral tablet: 1 tab(s) orally once a day (2019 06:13)  triamterene-hydrochlorothiazide 37.5 mg- 25 mg oral capsule: 1 cap(s) orally once a day (2019 06:13)  Vitamin D2:  (2019 06:13)  Vitamin D3 1000 intl units oral tablet: 1 tab(s) orally once a day (2019 06:13)    MEDICATIONS:  STANDING MEDICATIONS  cefTRIAXone   IVPB 1000 milliGRAM(s) IV Intermittent every 24 hours  chlorhexidine 4% Liquid 1 Application(s) Topical <User Schedule>  cholecalciferol 1000 Unit(s) Oral daily  heparin  Injectable 5000 Unit(s) SubCutaneous every 8 hours  levothyroxine 125 MICROGram(s) Oral daily  sodium chloride 0.9%. 1000 milliLiter(s) (75 mL/Hr) IV Continuous <Continuous>    PRN MEDICATIONS      VITAL SIGNS: Last 24 Hours  T(C): 36.4 (2019 13:02), Max: 36.4 (2019 13:02)  T(F): 97.6 (2019 13:02), Max: 97.6 (2019 13:02)  HR: 83 (2019 13:02) (83 - 94)  BP: 128/60 (2019 13:02) (111/64 - 137/64)  BP(mean): 87 (2019 13:02) (87 - 87)  RR: 20 (2019 13:02) (18 - 20)  SpO2: --    LABS:                        12.5   5.37  )-----------( 222      ( 2019 06:12 )             38.7     07-12    142  |  104  |  32<H>  ----------------------------<  132<H>  3.2<L>   |  23  |  1.5    Ca    8.9      2019 06:12  Mg     1.8     12    TPro  6.3  /  Alb  3.7  /  TBili  0.3  /  DBili  x   /  AST  17  /  ALT  18  /  AlkPhos  67  07-11    PT/INR - ( 10 Jul 2019 18:00 )   PT: 11.50 sec;   INR: 1.00 ratio         PTT - ( 10 Jul 2019 18:00 )  PTT:33.9 sec  Urinalysis Basic - ( 10 Jul 2019 21:00 )    Color: Yellow / Appearance: Slightly Turbid / S.014 / pH: x  Gluc: x / Ketone: Negative  / Bili: Negative / Urobili: <2 mg/dL   Blood: x / Protein: Trace / Nitrite: Positive   Leuk Esterase: Large / RBC: 5 /HPF /  /HPF   Sq Epi: x / Non Sq Epi: 3 /HPF / Bacteria: Many          CARDIAC MARKERS ( 10 Jul 2019 18:00 )  x     / <0.01 ng/mL / x     / x     / x            RADIOLOGY:     EKG: NSR@96 w/ L axis deviation      	RADIOLOGY:    	IMPRESSION:    	No acute fracture or significant subluxation of the cervical spine.    	< end of copied text >        	< from: Xray Pelvis AP only (07.10.19 @ 18:52) >    	impression: No acutely displaced pelvic ring fracture. 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 Mild bilateral   	sacroiliac joint degenerative change. Vascular calcifications present.    	< end of copied text >          	< from: CT Head No Cont (07.10.19 @ 19:49) >      	IMPRESSION:    	No CT evidence for acute intracranial pathology.    	< end of copied text >      PHYSICAL EXAM:    GENERAL: NAD, well-developed, AAOx3  HEENT:  Atraumatic, Normocephalic. EOMI, PERRLA, conjunctiva and sclera clear, No JVD  PULMONARY: Clear to auscultation bilaterally; No wheeze  CARDIOVASCULAR: Regular rate and rhythm; No murmurs, rubs, or gallops  GASTROINTESTINAL: Soft, Nontender, Nondistended; Bowel sounds present  MUSCULOSKELETAL:  2+ Peripheral Pulses, No clubbing, cyanosis, or edema  NEUROLOGY: non-focal  SKIN: No rashes or lesions    ASSESSMENT & PLAN    87 y/o female w/ past medical history of  COPD, hypertension, hypothyroidism presenting with chief complaint of near syncope.    #) Near syncope secondary to orthostatic hypotension secondary to dehydration  - /84 supine --> 76/44 standing. Pt admits to decreased PO fluid intake at home over the last couple of weeks.  - Etiology unlikely cardiac. EKG: NSR@96 w/ L axis deviation  - Seizure unlikely given history-> No indication for EEG  - CTH -ve for acute intracranial pathology  - Repeat orthostatics  - D/C anti-hypertensives (Triamterene-HCTZ 25/37.5mg).  - Compression stockings. NS 0.9% at 75cc/hr.  - TSH, Vitamin D, B12, and folate ordered.  - Pt educated about importance of increasing hydration and taking her time on standing up from seated position.    #Acute cystitis  - U/A significantly positive with many bacteria, wbc of 301, positive nitrites, and large leukocyte esterase. Pt endorses "some" frequency.  - Rocephin [id number]. F/u urine culture  - Hypotension and mild tachycardia likely dehydration induced rather than infection-driven. Both improved on IVF gentle hydration.    #Acute Kidney injury  - Cr 1.7 on admission. No history of CKD. No baseline Cr available.  - likely pre-renal in etiology.   - Monitor BMP daily on IVFs    #Hypertension  - Blood pressure controlled/hypotensive on presentation.  - D/C anti-hypertensives (Triamterene-HCTZ 25/37.5mg) and continue to monitor.    #Hypothyroidism  -C/w synthroid 125mcg    #suspected vit D deficiency  -C/w vit D supplements    #COPD - stable  - No wheezing on physical exam. No increased cough or sputum production. CXR stable    Diet: DASH    DVT ppx: Heparin SQ    Activity: ambulate as tolerated    Dispo: Telemetry    Code Status: Full CATALINA COOK 86y Female  MRN#: [id number]   CODE STATUS: FULL      SUBJECTIVE  Patient is a 86y old Female who presents with a chief complaint of Near syncope (2019 05:30)    Currently admitted to medicine with the primary diagnosis of near syncope due to dehydration.     Today is hospital day 2d, and this morning she is laying in bed comfortably and reports no overnight events.  She feels much better today.     Present Today:           Metz Catheter (x)No/ ()Yes?   Indication:             Central Line (x)No/ ()Yes?   Indication:          IV Fluids ()No/ (x)Yes? Type: NS  Rate:  75 Indication: Dehydration     OBJECTIVE  PAST MEDICAL & SURGICAL HISTORY  Hypothyroid  COPD (chronic obstructive pulmonary disease)  HTN (hypertension)  History of skin cancer  H/O total hysterectomy  History of cholecystectomy    ALLERGIES:  No Known Allergies    HOME MEDICATIONS:  Home Medications:  Synthroid 125 mcg (0.125 mg) oral tablet: 1 tab(s) orally once a day (2019 06:13)  triamterene-hydrochlorothiazide 37.5 mg- 25 mg oral capsule: 1 cap(s) orally once a day (2019 06:13)  Vitamin D2:  (2019 06:13)  Vitamin D3 1000 intl units oral tablet: 1 tab(s) orally once a day (2019 06:13)    MEDICATIONS:  STANDING MEDICATIONS  cefTRIAXone   IVPB 1000 milliGRAM(s) IV Intermittent every 24 hours  chlorhexidine 4% Liquid 1 Application(s) Topical <User Schedule>  cholecalciferol 1000 Unit(s) Oral daily  heparin  Injectable 5000 Unit(s) SubCutaneous every 8 hours  levothyroxine 125 MICROGram(s) Oral daily  sodium chloride 0.9%. 1000 milliLiter(s) (75 mL/Hr) IV Continuous <Continuous>    PRN MEDICATIONS    VITAL SIGNS: Last 24 Hours  T(C): 36.4 (2019 13:02), Max: 36.4 (2019 13:02)  T(F): 97.6 (2019 13:02), Max: 97.6 (2019 13:02)  HR: 83 (2019 13:02) (83 - 94)  BP: 128/60 (2019 13:02) (111/64 - 137/64)  BP(mean): 87 (2019 13:02) (87 - 87)  RR: 20 (2019 13:02) (18 - 20)  SpO2: --    LABS:                        12.5   5.37  )-----------( 222      ( 2019 06:12 )             38.7     -12    142  |  104  |  32<H>  ----------------------------<  132<H>  3.2<L>   |  23  |  1.5    Ca    8.9      2019 06:12  Mg     1.8     12    TPro  6.3  /  Alb  3.7  /  TBili  0.3  /  DBili  x   /  AST  17  /  ALT  18  /  AlkPhos  67  07-11    PT/INR - ( 10 Jul 2019 18:00 )   PT: 11.50 sec;   INR: 1.00 ratio      PTT - ( 10 Jul 2019 18:00 )  PTT:33.9 sec  Urinalysis Basic - ( 10 Jul 2019 21:00 )    Color: Yellow / Appearance: Slightly Turbid / S.014 / pH: x  Gluc: x / Ketone: Negative  / Bili: Negative / Urobili: <2 mg/dL   Blood: x / Protein: Trace / Nitrite: Positive   Leuk Esterase: Large / RBC: 5 /HPF /  /HPF   Sq Epi: x / Non Sq Epi: 3 /HPF / Bacteria: Many    CARDIAC MARKERS ( 10 Jul 2019 18:00 )  x     / <0.01 ng/mL / x     / x     / x        RADIOLOGY:     EKG: NSR@96 w/ L axis deviation    	RADIOLOGY:    	IMPRESSION:    	No acute fracture or significant subluxation of the cervical spine.    	< end of copied text >    	< from: Xray Pelvis AP only (07.10.19 @ 18:52) >    Impression: No acutely displaced pelvic ring fracture. 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 Mild bilateral   	sacroiliac joint degenerative change. Vascular calcifications present.    	< end of copied text >    	< from: CT Head No Cont (07.10.19 @ 19:49) >    	IMPRESSION:    	No CT evidence for acute intracranial pathology.    	< end of copied text >    PHYSICAL EXAM:    GENERAL: NAD, well-developed, AAOx2/3  HEENT:  Atraumatic, Normocephalic. EOMI, PERRLA, conjunctiva and sclera clear, No JVD, wearing sunglasses, blepharospasm of both eyes   PULMONARY: Clear to auscultation bilaterally; No wheeze  CARDIOVASCULAR: Regular rate and rhythm; No murmurs, rubs, or gallops  GASTROINTESTINAL: Soft, Nontender, Nondistended; Bowel sounds present  MUSCULOSKELETAL:  2+ Peripheral Pulses, No clubbing, cyanosis, or edema  NEUROLOGY: non-focal, no tremor or asterixis   SKIN: No rashes or lesions    ASSESSMENT & PLAN    85 y/o female w/ past medical history of  COPD, hypertension, hypothyroidism presenting with chief complaint of near syncope.    #) Near syncope secondary to orthostatic hypotension secondary to dehydration and antihypertensive use  - /84 supine --> 76/44 standing, repeat was improved   - Pt admits to decreased PO fluid intake at home over the last couple of weeks  - EKG: NSR@96 w/ L axis deviation  - Seizure unlikely given history-> No indication for EEG  - CTH -ve for acute intracranial pathology  - D/C anti-hypertensives (Triamterene-HCTZ 25/37.5mg)  - Compression stockings. NS 0.9% at 75cc/hr.  - TSH low ,B12 borderline low, Folate wnl   - Pt educated about importance of increasing hydration and taking her time on standing up from seated position    #) Acute cystitis  - U/A significantly positive with many bacteria, wbc of 301, positive nitrites, and large leukocyte esterase.  -  Pt endorses  frequency, denies dysuria   - Rocephin [id number]  -  F/u urine culture    #) Acute Kidney injury, possibly CKD  - Cr 1.7 on admission, now 1.5. No baseline Cr available.  - likely pre-renal in etiology  - Monitor BMP daily on IVFs    #) Hypertension  - Blood pressure controlled/hypotensive on presentation  - D/C anti-hypertensives (Triamterene-HCTZ 25/37.5mg) and continue to monitor    #) Hypothyroidism  -C/w synthroid 125mcg  -TSH low, may need to decrease synthroid dose     #) ?Dementia, possibly with Lewy bodies  -Per family patient has visual hallucination, memory loss in the past year  -Has seen outpatient Neuruologists, not on home medications for now    #) History of b/l blepharospasm  -refractory to Botox injections x 3, patient wears sunglasses due to discomfort     #) Suspected Vit D deficiency  -C/w vit D supplements    #) COPD - stable  - No wheezing on physical exam.   -Not in exacerbation  - CXR stable    Diet: DASH    DVT ppx: Heparin SQ    Activity: ambulate as tolerated    Dispo: Telemetry    Code Status: Full CATALINA COOK 86y Female  MRN#: [id number]   CODE STATUS: FULL      SUBJECTIVE  Patient is a 86y old Female who presents with a chief complaint of Near syncope (2019 05:30)    Currently admitted to medicine with the primary diagnosis of near syncope due to dehydration.     Today is hospital day 2d, and this morning she is laying in bed comfortably and reports no overnight events.  She feels much better today.     Present Today:           Metz Catheter (x)No/ ()Yes?   Indication:             Central Line (x)No/ ()Yes?   Indication:          IV Fluids ()No/ (x)Yes? Type: NS  Rate:  75 Indication: Dehydration     OBJECTIVE  PAST MEDICAL & SURGICAL HISTORY  Hypothyroid  COPD (chronic obstructive pulmonary disease)  HTN (hypertension)  History of skin cancer  H/O total hysterectomy  History of cholecystectomy    ALLERGIES:  No Known Allergies    HOME MEDICATIONS:  Home Medications:  Synthroid 125 mcg (0.125 mg) oral tablet: 1 tab(s) orally once a day (2019 06:13)  triamterene-hydrochlorothiazide 37.5 mg- 25 mg oral capsule: 1 cap(s) orally once a day (2019 06:13)  Vitamin D2:  (2019 06:13)  Vitamin D3 1000 intl units oral tablet: 1 tab(s) orally once a day (2019 06:13)    MEDICATIONS:  STANDING MEDICATIONS  cefTRIAXone   IVPB 1000 milliGRAM(s) IV Intermittent every 24 hours  chlorhexidine 4% Liquid 1 Application(s) Topical <User Schedule>  cholecalciferol 1000 Unit(s) Oral daily  heparin  Injectable 5000 Unit(s) SubCutaneous every 8 hours  levothyroxine 125 MICROGram(s) Oral daily  sodium chloride 0.9%. 1000 milliLiter(s) (75 mL/Hr) IV Continuous <Continuous>    PRN MEDICATIONS    VITAL SIGNS: Last 24 Hours  T(C): 36.4 (2019 13:02), Max: 36.4 (2019 13:02)  T(F): 97.6 (2019 13:02), Max: 97.6 (2019 13:02)  HR: 83 (2019 13:02) (83 - 94)  BP: 128/60 (2019 13:02) (111/64 - 137/64)  BP(mean): 87 (2019 13:02) (87 - 87)  RR: 20 (2019 13:02) (18 - 20)  SpO2: --    LABS:                        12.5   5.37  )-----------( 222      ( 2019 06:12 )             38.7     -12    142  |  104  |  32<H>  ----------------------------<  132<H>  3.2<L>   |  23  |  1.5    Ca    8.9      2019 06:12  Mg     1.8     12    TPro  6.3  /  Alb  3.7  /  TBili  0.3  /  DBili  x   /  AST  17  /  ALT  18  /  AlkPhos  67  07-11    PT/INR - ( 10 Jul 2019 18:00 )   PT: 11.50 sec;   INR: 1.00 ratio      PTT - ( 10 Jul 2019 18:00 )  PTT:33.9 sec  Urinalysis Basic - ( 10 Jul 2019 21:00 )    Color: Yellow / Appearance: Slightly Turbid / S.014 / pH: x  Gluc: x / Ketone: Negative  / Bili: Negative / Urobili: <2 mg/dL   Blood: x / Protein: Trace / Nitrite: Positive   Leuk Esterase: Large / RBC: 5 /HPF /  /HPF   Sq Epi: x / Non Sq Epi: 3 /HPF / Bacteria: Many    CARDIAC MARKERS ( 10 Jul 2019 18:00 )  x     / <0.01 ng/mL / x     / x     / x        RADIOLOGY:     EKG: NSR@96 w/ L axis deviation    	RADIOLOGY:    	IMPRESSION:    	No acute fracture or significant subluxation of the cervical spine.    	< end of copied text >    	< from: Xray Pelvis AP only (07.10.19 @ 18:52) >    Impression: No acutely displaced pelvic ring fracture. 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 Mild bilateral   	sacroiliac joint degenerative change. Vascular calcifications present.    	< end of copied text >    	< from: CT Head No Cont (07.10.19 @ 19:49) >    	IMPRESSION:    	No CT evidence for acute intracranial pathology.    	< end of copied text >    PHYSICAL EXAM:    GENERAL: NAD, well-developed, AAOx2/3  HEENT:  Atraumatic, Normocephalic. EOMI, PERRLA, conjunctiva and sclera clear, No JVD, wearing sunglasses, blepharospasm of both eyes   PULMONARY: Clear to auscultation bilaterally; No wheeze  CARDIOVASCULAR: Regular rate and rhythm; No murmurs, rubs, or gallops  GASTROINTESTINAL: Soft, Nontender, Nondistended; Bowel sounds present  MUSCULOSKELETAL:  2+ Peripheral Pulses, No clubbing, cyanosis, or edema  NEUROLOGY: non-focal, no tremor or asterixis   SKIN: No rashes or lesions    ASSESSMENT & PLAN    87 y/o female w/ past medical history of  COPD, hypertension, hypothyroidism presenting with chief complaint of near syncope.    #) Near syncope secondary to orthostatic hypotension secondary to dehydration and antihypertensive use  - /84 supine --> 76/44 standing, repeat was improved   - Pt admits to decreased PO fluid intake at home over the last couple of weeks, no specific reasons, pt just is not a "good drinker"  - EKG: NSR@96 w/ L axis deviation  - Seizure unlikely given history-> No indication for EEG  - CTH -ve for acute intracranial pathology  - D/C anti-hypertensives (Triamterene-HCTZ 25/37.5mg)  - Compression stockings. NS 0.9% at 75cc/hr.  - ECHO pending  - TSH low ,B12 borderline low, Folate wnl   - Pt educated about importance of increasing hydration and taking her time on standing up from seated position  - keep on tele for 24 hrs, if no events can dc tele monitoring    #) Acute cystitis  - U/A significantly positive with many bacteria, wbc of 301, positive nitrites, and large leukocyte esterase.  -  Pt endorses  frequency, denies burning   - Rocephin [id number] until Cx are back  -  F/u urine culture    #) Acute Kidney injury, possibly CKD  - Cr 1.7 on admission, now 1.5. No baseline Cr available.  - likely pre-renal in etiology  - Monitor BMP daily on IVFs    #) Hypertension  - Blood pressure controlled/hypotensive on presentation  - D/C anti-hypertensives (Triamterene-HCTZ 25/37.5mg) and continue to monitor    #) Hypothyroidism  -C/w synthroid 125mcg  -TSH low, check free T3/4, may need to decrease synthroid dose     #) ?Dementia, possibly with Lewy bodies  -Per family patient has visual hallucination, memory loss in the past year  -Has seen outpatient Neuruologists, not on home medications for now    #) History of b/l blepharospasm  -refractory to Botox injections x 3, patient wears sunglasses due to discomfort     #) Vit D deficiency  - Vit D level 21  -C/w vit D supplements    #) COPD - stable  - No wheezing on physical exam.   -Not in exacerbation  - CXR stable    Diet: DASH    DVT ppx: Heparin SQ    Activity: ambulate as tolerated    Dispo: Telemetry    Code Status: Full

## 2019-07-12 NOTE — PROGRESS NOTE ADULT - ASSESSMENT
IMPRESSION: - Rehab of new onset impaired balance with no other focal neurologic findings except consistantly leans to left. Possible peripheral vs central vestibular dysfunction.                                     - Possible post concussion syndrome as cause of impaired balance                                      - Consider R/O orthostatic hypotension as cause of falls    PRECAUTIONS: [  ] Cardiac  [  ] Respiratory  [  ] Seizures [  ] Contact Isolation  [  ] Droplet Isolation  [  ] Other    Weight Bearing Status: wbat    RECOMMENDATION:  Would consider MRI of brain to R/O small brainstem lesion if no resolution. Will continue PT    Out of Bed to Chair     DVT/Decubiti Prophylaxis    REHAB PLAN:     [ x  ] Bedside P/T 3-5 times a week   [   ]   Bedside O/T  2-3 times a week             [   ] No Rehab Therapy Indicated                   [   ]  Speech Therapy   Conditioning/ROM                                    ADL  Bed Mobility                                               Conditioning/ROM  Transfers                                                     Bed Mobility  Sitting /Standing Balance                         Transfers                                        Gait Training                                               Sitting/Standing Balance  Stair Training [   ]Applicable                    Home equipment Eval                                                                        Splinting  [   ] Only      GOALS:   ADL   [ x  ]   Independent                    Transfers  [ x  ] Independent                          Ambulation  [ x  ] Independent     [    ] With device                            [   ]  CG                                                         [   ]  CG                                                                  [   ] CG                            [    ] Min A                                                   [   ] Min A                                                              [   ] Min  A          DISCHARGE PLAN:   [   ]  Good candidate for Intensive Rehabilitation/Hospital based-[facility]A [facility]                                             Will tolerate 3hrs Intensive Rehab Daily                                       [    ]  Short Term Rehab in Skilled Nursing Facility                                       [  x  ]  Home with Outpatient or VN services                                         [    ]  Possible Candidate for Intensive Hospital based Rehab

## 2019-07-12 NOTE — PHYSICAL THERAPY INITIAL EVALUATION ADULT - PERTINENT HX OF CURRENT PROBLEM, REHAB EVAL
85 y/o female w/ past medical history of  COPD, hypertension, hypothyroidism presenting with chief complaint of near syncope.

## 2019-07-12 NOTE — PROGRESS NOTE ADULT - SUBJECTIVE AND OBJECTIVE BOX
HPI:  85 y/o female w/ past medical history of  COPD, hypertension, hypothyroidism presenting with chief complaint of near syncope.    History goes back to yesterday afternoon when pt was getting up from her chair, felt dizzy, lightheaded, then lost her balance and fell on the floor. She hit her head on the stove, but denies any loss of consciousness She denies any other preceding aura, vision changes, confusion, tongue biting, tonic-clonic movements, bowel or urinary incontinence, or palpitations. Her fall was witnessed by her daughter who helped her up and called 911. Pt had a similar episode last week under similar circumstances without LOC and was told by her PMD to present to the ED if the episode recurs.   She denies any dysuria, urgency or frequency.    In the ED, VS: BP 92/57  T98 RR 20 satting 93%. U/A was +ve. She received Ceftriaxone 1g q24hr, and 1L LR. (2019 05:30)      PAST MEDICAL & SURGICAL HISTORY:  Hypothyroid  COPD (chronic obstructive pulmonary disease)  HTN (hypertension)  History of skin cancer  H/O total hysterectomy  History of cholecystectomy      Hospital Course:  She was diagnosed with a UTI and started on IV antibiotics. Seen by PT today and noted to have impaired balance- leans to the left and requires contact guarding/ min assist for ambulation.      TODAY'S SUBJECTIVE & REVIEW OF SYMPTOMS:     Constitutional WNL   Cardio WNL   Resp WNL   GI WNL  Heme WNL  Endo WNL  Skin WNL  MSK WNL  Neuro see hpi  Cognitive dementia as per chart  Psych WNL      MEDICATIONS  (STANDING):  cefTRIAXone   IVPB 1000 milliGRAM(s) IV Intermittent every 24 hours  chlorhexidine 4% Liquid 1 Application(s) Topical <User Schedule>  cholecalciferol 1000 Unit(s) Oral daily  heparin  Injectable 5000 Unit(s) SubCutaneous every 8 hours  levothyroxine 125 MICROGram(s) Oral daily  sodium chloride 0.9%. 1000 milliLiter(s) (75 mL/Hr) IV Continuous <Continuous>    MEDICATIONS  (PRN):      FAMILY HISTORY:  FH: hypertension      Allergies    No Known Allergies    Intolerances        SOCIAL HISTORY:    [  ] Etoh  [  ] Smoking  [  ] Substance abuse     Home Environment:  [  ] Home Alone  [x  ] Lives with Family  [  ] Home Health Aid    Dwelling:  [  ] Apartment  [x  ] Private House  [  ] Adult Home  [  ] Skilled Nursing Facility      [  ] Short Term  [  ] Long Term  [  ] Stairs       Elevator [  ]    FUNCTIONAL STATUS PTA: (Check all that apply)  Ambulation: [ x  ]Independent    [  ] Dependent     [  ] Non-Ambulatory  Assistive Device: [  ] SA Cane  [  ]  Q Cane  [  ] Walker  [  ]  Wheelchair  ADL : [ x ] Independent  [  ]  Dependent       Vital Signs Last 24 Hrs  T(C): 36.4 (2019 13:02), Max: 36.4 (2019 13:02)  T(F): 97.6 (2019 13:02), Max: 97.6 (2019 13:02)  HR: 83 (2019 13:02) (83 - 94)  BP: 128/60 (2019 13:02) (124/59 - 137/64)  BP(mean): 87 (2019 13:02) (87 - 87)  RR: 20 (2019 13:02) (18 - 20)  SpO2: --      PHYSICAL EXAM: Alert & Oriented X3  GENERAL: NAD, well-groomed, well-developed  HEAD:  Atraumatic, Normocephalic  EYES: EOMI, PER. (chronic severe light sensitivity)  NECK: Supple, No JVD  CHEST/LUNG: Clear  HEART: Regular rate and rhythm;  ABDOMEN: Soft, Nontender,  EXTREMITIES:  2+ Peripheral Pulses, No clubbing, cyanosis, or edema    NERVOUS SYSTEM:  Cranial Nerves 2-12 intact [x  ] Abnormal  [  ]  ROM: WFL all extremities [x  ]  Abnormal [  ]  Motor Strength: WFL all extremities  [x  ]  Abnormal [  ]  Sensation: intact to light touch [ x ] Abnormal [  ]  Reflexes: Symmetric [x  ]  Abnormal [  ]    FUNCTIONAL STATUS:  Bed Mobility: Independent [  ]  Supervision [  ]  Needs Assistance [  ]  N/A [  ]  Transfers: Independent [x  ]  Supervision [  ]  Needs Assistance [  ]  N/A [  ]   Ambulation: Independent [  ]  Supervision [x  ]  Needs Assistance [  ]  N/A [  ]  ADL: Independent [  ] Requires Assistance [  ] N/A [  ]      LABS:                        12.5   5.37  )-----------( 222      ( 2019 06:12 )             38.7     07-12    142  |  104  |  32<H>  ----------------------------<  132<H>  3.2<L>   |  23  |  1.5    Ca    8.9      2019 06:12  Mg     1.8     07-12    TPro  6.3  /  Alb  3.7  /  TBili  0.3  /  DBili  x   /  AST  17  /  ALT  18  /  AlkPhos  67  07-11    PT/INR - ( 10 Jul 2019 18:00 )   PT: 11.50 sec;   INR: 1.00 ratio         PTT - ( 10 Jul 2019 18:00 )  PTT:33.9 sec  Urinalysis Basic - ( 10 Jul 2019 21:00 )    Color: Yellow / Appearance: Slightly Turbid / S.014 / pH: x  Gluc: x / Ketone: Negative  / Bili: Negative / Urobili: <2 mg/dL   Blood: x / Protein: Trace / Nitrite: Positive   Leuk Esterase: Large / RBC: 5 /HPF /  /HPF   Sq Epi: x / Non Sq Epi: 3 /HPF / Bacteria: Many        RADIOLOGY & ADDITIONAL STUDIES:    Assesment:

## 2019-07-12 NOTE — PROGRESS NOTE ADULT - ATTENDING COMMENTS
Agree with resident's note, HPI, PE, assessment and plan, I have edited the note  Pt was seen and examined independently.    #Progress Note Handoff  Pending  Consults: none  Tests: ECHO  Test Results: free T3/4  Family Discussion: not needed  Future Disposition: home

## 2019-07-12 NOTE — PHYSICAL THERAPY INITIAL EVALUATION ADULT - GENERAL OBSERVATIONS, REHAB EVAL
pt encountered sitting at EOB in NAD just returned to bed after using commode with nurse. agreeable to PT IE. Nurse took pts BP in supine, sitting, and standing and reported pt has some degree of orthostatic hypotension. pt reported she felt well and was not dizzy/lightheaded.

## 2019-07-12 NOTE — CHART NOTE - NSCHARTNOTEFT_GEN_A_CORE
Nurse called to report patient becoming aggressive and agitated. Patient was aggressive towards the staff but was oriented but delusional. Will recommend timely reorientations and will hold from using the haloperidol for now.

## 2019-07-12 NOTE — PHYSICAL THERAPY INITIAL EVALUATION ADULT - PLANNED THERAPY INTERVENTIONS, PT EVAL
motor coordination training/strengthening/balance training/bed mobility training/gait training/transfer training/neuromuscular re-education

## 2019-07-13 VITALS — HEART RATE: 92 BPM | TEMPERATURE: 98 F

## 2019-07-13 LAB
ANION GAP SERPL CALC-SCNC: 17 MMOL/L — HIGH (ref 7–14)
BASOPHILS # BLD AUTO: 0.07 K/UL — SIGNIFICANT CHANGE UP (ref 0–0.2)
BASOPHILS NFR BLD AUTO: 1.1 % — HIGH (ref 0–1)
BUN SERPL-MCNC: 31 MG/DL — HIGH (ref 10–20)
CALCIUM SERPL-MCNC: 9.1 MG/DL — SIGNIFICANT CHANGE UP (ref 8.5–10.1)
CHLORIDE SERPL-SCNC: 104 MMOL/L — SIGNIFICANT CHANGE UP (ref 98–110)
CO2 SERPL-SCNC: 22 MMOL/L — SIGNIFICANT CHANGE UP (ref 17–32)
CREAT SERPL-MCNC: 1.5 MG/DL — SIGNIFICANT CHANGE UP (ref 0.7–1.5)
EOSINOPHIL # BLD AUTO: 0.08 K/UL — SIGNIFICANT CHANGE UP (ref 0–0.7)
EOSINOPHIL NFR BLD AUTO: 1.3 % — SIGNIFICANT CHANGE UP (ref 0–8)
GLUCOSE SERPL-MCNC: 162 MG/DL — HIGH (ref 70–99)
HCT VFR BLD CALC: 37.8 % — SIGNIFICANT CHANGE UP (ref 37–47)
HGB BLD-MCNC: 12.4 G/DL — SIGNIFICANT CHANGE UP (ref 12–16)
IMM GRANULOCYTES NFR BLD AUTO: 0.2 % — SIGNIFICANT CHANGE UP (ref 0.1–0.3)
LYMPHOCYTES # BLD AUTO: 0.73 K/UL — LOW (ref 1.2–3.4)
LYMPHOCYTES # BLD AUTO: 11.7 % — LOW (ref 20.5–51.1)
MAGNESIUM SERPL-MCNC: 1.9 MG/DL — SIGNIFICANT CHANGE UP (ref 1.8–2.4)
MCHC RBC-ENTMCNC: 29.5 PG — SIGNIFICANT CHANGE UP (ref 27–31)
MCHC RBC-ENTMCNC: 32.8 G/DL — SIGNIFICANT CHANGE UP (ref 32–37)
MCV RBC AUTO: 90 FL — SIGNIFICANT CHANGE UP (ref 81–99)
MONOCYTES # BLD AUTO: 0.36 K/UL — SIGNIFICANT CHANGE UP (ref 0.1–0.6)
MONOCYTES NFR BLD AUTO: 5.8 % — SIGNIFICANT CHANGE UP (ref 1.7–9.3)
NEUTROPHILS # BLD AUTO: 4.98 K/UL — SIGNIFICANT CHANGE UP (ref 1.4–6.5)
NEUTROPHILS NFR BLD AUTO: 79.9 % — HIGH (ref 42.2–75.2)
NRBC # BLD: 0 /100 WBCS — SIGNIFICANT CHANGE UP (ref 0–0)
PLATELET # BLD AUTO: 148 K/UL — SIGNIFICANT CHANGE UP (ref 130–400)
POTASSIUM SERPL-MCNC: 4.1 MMOL/L — SIGNIFICANT CHANGE UP (ref 3.5–5)
POTASSIUM SERPL-SCNC: 4.1 MMOL/L — SIGNIFICANT CHANGE UP (ref 3.5–5)
RBC # BLD: 4.2 M/UL — SIGNIFICANT CHANGE UP (ref 4.2–5.4)
RBC # FLD: 13.4 % — SIGNIFICANT CHANGE UP (ref 11.5–14.5)
SODIUM SERPL-SCNC: 143 MMOL/L — SIGNIFICANT CHANGE UP (ref 135–146)
T3 SERPL-MCNC: 75 NG/DL — LOW (ref 80–200)
T4 FREE SERPL-MCNC: 1.7 NG/DL — SIGNIFICANT CHANGE UP (ref 0.9–1.8)
WBC # BLD: 6.23 K/UL — SIGNIFICANT CHANGE UP (ref 4.8–10.8)
WBC # FLD AUTO: 6.23 K/UL — SIGNIFICANT CHANGE UP (ref 4.8–10.8)

## 2019-07-13 PROCEDURE — 99239 HOSP IP/OBS DSCHRG MGMT >30: CPT

## 2019-07-13 RX ORDER — ERGOCALCIFEROL 1.25 MG/1
0 CAPSULE ORAL
Qty: 0 | Refills: 0 | DISCHARGE

## 2019-07-13 RX ORDER — SODIUM CHLORIDE 9 MG/ML
250 INJECTION INTRAMUSCULAR; INTRAVENOUS; SUBCUTANEOUS ONCE
Refills: 0 | Status: COMPLETED | OUTPATIENT
Start: 2019-07-13 | End: 2019-07-13

## 2019-07-13 RX ORDER — TRIAMTERENE/HYDROCHLOROTHIAZID 75 MG-50MG
1 TABLET ORAL
Qty: 0 | Refills: 0 | DISCHARGE

## 2019-07-13 RX ORDER — HALOPERIDOL DECANOATE 100 MG/ML
2 INJECTION INTRAMUSCULAR ONCE
Refills: 0 | Status: COMPLETED | OUTPATIENT
Start: 2019-07-13 | End: 2019-07-13

## 2019-07-13 RX ADMIN — HALOPERIDOL DECANOATE 2 MILLIGRAM(S): 100 INJECTION INTRAMUSCULAR at 04:00

## 2019-07-13 RX ADMIN — HEPARIN SODIUM 5000 UNIT(S): 5000 INJECTION INTRAVENOUS; SUBCUTANEOUS at 14:01

## 2019-07-13 RX ADMIN — SODIUM CHLORIDE 500 MILLILITER(S): 9 INJECTION INTRAMUSCULAR; INTRAVENOUS; SUBCUTANEOUS at 11:49

## 2019-07-13 RX ADMIN — Medication 1000 UNIT(S): at 11:50

## 2019-07-13 NOTE — DISCHARGE NOTE PROVIDER - NSDCCPCAREPLAN_GEN_ALL_CORE_FT
PRINCIPAL DISCHARGE DIAGNOSIS  Diagnosis: Near syncope  Assessment and Plan of Treatment: You were found to have positive orthostatic test suggesting you may have been dehydrated, and in combination with your blood pressure medication may have contributed to your low blood presssure.  You were advised to not take your BP medication (Triamterene-HCTZ) untl discussing it with your PMD.      SECONDARY DISCHARGE DIAGNOSES  Diagnosis: Abnormal urinalysis  Assessment and Plan of Treatment: Your Urinalysis was slighlty abnormal, you were given IV antibiotics for a presumed Urinary tract infection for 3 days, if you have any fevers, chills, dysuria, urinary frequency, or suprapubic pain you should notify your PMD.    Diagnosis: YONY (acute kidney injury)  Assessment and Plan of Treatment: Your kidneys were slightly injured due to dehyrdation but recovered well with IV fluids. Follow up with your PMD to discuss monitoring your kidney function.    Diagnosis: YONY (acute kidney injury)  Assessment and Plan of Treatment: Your kidneys were slightly injured due to dehyrdation but recovered well with IV fluids. Follow up with your PMD to discuss monitoring your kidney function.

## 2019-07-13 NOTE — PROGRESS NOTE ADULT - ASSESSMENT
·	Near fainting, likely due to borderline BP and orthostatism  ·	Known with HTN, on diuretics as outpatient   ·	NO evidence of arrhythmia on cardiac telemonitoring   ·	Vit D deficiency on supplementation  ·	Hypothyroidism, no evidence of hyperthyroidism clinically or based on thyroid blood studies    P L A N   ·	Need to DC diuretics and be off anti-hypertensives for now (remains orthostatic)  ·	Patient is most likely chronically orthostatic (dysautonomia), unmasked by diuretics and anti-HTN  ·	F/U with primary care physician as outpatient     Discussed with daughter re:  . Education regarding life style modification and measures to deal with orthostatism  . Allow -160; patient at high risk for fall and serious injury with tight BP control   . F/U primary care physician     Time for care/ education/ resident supervision: 35 minutes

## 2019-07-13 NOTE — CHART NOTE - NSCHARTNOTEFT_GEN_A_CORE
Orthostatics performed, still positive but better, and pt remained asymptomatic. Will give her a 250cc NS bolus, she was advised on how to get up slowly from a lying position to avoid symptoms. Orthostatics performed, still positive but better, and pt remained asymptomatic. Will give her a 250cc NS bolus, she was advised on how to get up slowly from a lying position to avoid symptoms. Remains stable for discharge

## 2019-07-13 NOTE — DISCHARGE NOTE NURSING/CASE MANAGEMENT/SOCIAL WORK - NSDCDPATPORTLINK_GEN_ALL_CORE
You can access the [facility] Patient Portal, offered by [facility], by registering with the following website: [email or web address]

## 2019-07-13 NOTE — PROGRESS NOTE ADULT - SUBJECTIVE AND OBJECTIVE BOX
CATALINA COOK  86y, Female  Allergy: No Known Allergies    Hospital Day: 3d    Patient seen and examined earlier today.       LAST 24-Hr EVENTS:    VITALS:  T(F): 97.2 (07-12-19 @ 22:47), Max: 97.6 (07-12-19 @ 13:02)  HR: 88 (07-12-19 @ 22:47)  BP: 132/66 (07-12-19 @ 22:47) (124/59 - 137/64)  RR: 20 (07-12-19 @ 22:47)      TESTS & MEASUREMENTS:  Weight (Kg): 93 (07-12-19 @ 07:54)  BMI (kg/m2): 36.3 (07-12)    07-11-19 @ 07:01  -  07-12-19 @ 07:00  --------------------------------------------------------  IN: 425 mL / OUT: 600 mL / NET: -175 mL    07-13-19 @ 07:01  -  07-13-19 @ 07:31  --------------------------------------------------------  IN: 0 mL / OUT: 400 mL / NET: -400 mL                            12.5   5.37  )-----------( 222      ( 12 Jul 2019 06:12 )             38.7       07-12    142  |  104  |  32<H>  ----------------------------<  132<H>  3.2<L>   |  23  |  1.5    Ca    8.9      12 Jul 2019 06:12  Mg     1.8     07-12    TPro  6.3  /  Alb  3.7  /  TBili  0.3  /  DBili  x   /  AST  17  /  ALT  18  /  AlkPhos  67  07-11    LIVER FUNCTIONS - ( 11 Jul 2019 12:18 )  Alb: 3.7 g/dL / Pro: 6.3 g/dL / ALK PHOS: 67 U/L / ALT: 18 U/L / AST: 17 U/L / GGT: x           Free Thyroxine, Serum in AM (07.12.19 @ 16:10)    Free Thyroxine, Serum: 1.7 ng/dL    Thyroid Stimulating Hormone, Serum: 0.25 uIU/mL (07.11.19 @ 12:18)          RADIOLOGY & ADDITIONAL TESTS:  < from: Xray Chest 1 View-PORTABLE IMMEDIATE (07.10.19 @ 18:52) >  No radiographic evidence of acute cardiopulmonary disease.        RECENT DIAGNOSTIC ORDERS:  Transthoracic Echocardiogram:   Transport: Stretcher-Crib  Monitor: w/o Monitor  Provider's Contact #: [phone] (07-12-19 @ 10:37)  Complete Blood Count + Automated Diff: AM Sched. Collection: 13-Jul-2019 04:30 (07-12-19 @ 18:39)  Basic Metabolic Panel: AM Sched. Collection: 13-Jul-2019 04:30 (07-12-19 @ 18:39)  Magnesium, Serum: AM Sched. Collection: 13-Jul-2019 04:30 (07-12-19 @ 18:39)      MEDICATIONS:  MEDICATIONS  (STANDING):  cefTRIAXone   IVPB 1000 milliGRAM(s) IV Intermittent every 24 hours  chlorhexidine 4% Liquid 1 Application(s) Topical <User Schedule>  cholecalciferol 1000 Unit(s) Oral daily  heparin  Injectable 5000 Unit(s) SubCutaneous every 8 hours  levothyroxine 125 MICROGram(s) Oral daily  sodium chloride 0.9%. 1000 milliLiter(s) (75 mL/Hr) IV Continuous <Continuous>    MEDICATIONS  (PRN):      HOME MEDICATIONS:  Synthroid 125 mcg (0.125 mg) oral tablet (07-11)  triamterene-hydrochlorothiazide 37.5 mg- 25 mg oral capsule (07-11)  Vitamin D2 (07-11)  Vitamin D3 1000 intl units oral tablet (07-11)      PHYSICAL EXAM:  GENERAL: A&O x3,  in NAD/P,   NECK: No Swelling  CHEST/LUNG: Good air entry, No wheezing  HEART: RRR, No murmurs  ABDOMEN: Soft, Bowel sounds present  EXTREMITIES:  No clubbing, CATALINA COOK  86y, Female  Allergy: No Known Allergies    Hospital Day: 3d    Patient seen and examined earlier today.     LAST 24-Hr EVENTS:  Asymptomatic  Sitting in chair this AM, no complaints    VITALS:  T(F): 97.2 (07-12-19 @ 22:47), Max: 97.6 (07-12-19 @ 13:02)  HR: 88 (07-12-19 @ 22:47)  BP: 132/66 (07-12-19 @ 22:47) (124/59 - 137/64)  RR: 20 (07-12-19 @ 22:47)      TESTS & MEASUREMENTS:  Weight (Kg): 93 (07-12-19 @ 07:54)  BMI (kg/m2): 36.3 (07-12)    07-11-19 @ 07:01  -  07-12-19 @ 07:00  --------------------------------------------------------  IN: 425 mL / OUT: 600 mL / NET: -175 mL    07-13-19 @ 07:01  -  07-13-19 @ 07:31  --------------------------------------------------------  IN: 0 mL / OUT: 400 mL / NET: -400 mL                          12.5   5.37  )-----------( 222      ( 12 Jul 2019 06:12 )             38.7     07-12    142  |  104  |  32<H>  ----------------------------<  132<H>  3.2<L>   |  23  |  1.5    Ca    8.9      12 Jul 2019 06:12  Mg     1.8     07-12    TPro  6.3  /  Alb  3.7  /  TBili  0.3  /  DBili  x   /  AST  17  /  ALT  18  /  AlkPhos  67  07-11    LIVER FUNCTIONS - ( 11 Jul 2019 12:18 )  Alb: 3.7 g/dL / Pro: 6.3 g/dL / ALK PHOS: 67 U/L / ALT: 18 U/L / AST: 17 U/L / GGT: x           Free Thyroxine, Serum in AM (07.12.19 @ 16:10)    Free Thyroxine, Serum: 1.7 ng/dL    Thyroid Stimulating Hormone, Serum: 0.25 uIU/mL (07.11.19 @ 12:18)      RADIOLOGY & ADDITIONAL TESTS:  < from: Xray Chest 1 View-PORTABLE IMMEDIATE (07.10.19 @ 18:52) >  No radiographic evidence of acute cardiopulmonary disease.      MEDICATIONS:  MEDICATIONS  (STANDING):  cefTRIAXone   IVPB 1000 milliGRAM(s) IV Intermittent every 24 hours  chlorhexidine 4% Liquid 1 Application(s) Topical <User Schedule>  cholecalciferol 1000 Unit(s) Oral daily  heparin  Injectable 5000 Unit(s) SubCutaneous every 8 hours  levothyroxine 125 MICROGram(s) Oral daily  sodium chloride 0.9%. 1000 milliLiter(s) (75 mL/Hr) IV Continuous <Continuous>      HOME MEDICATIONS:  Synthroid 125 mcg (0.125 mg) oral tablet (07-11)  triamterene-hydrochlorothiazide 37.5 mg- 25 mg oral capsule (07-11)  Vitamin D2 (07-11)  Vitamin D3 1000 intl units oral tablet (07-11)      PHYSICAL EXAM:  GENERAL: A&O x3,  in NAD/P, sitting in chair  NECK: No Swelling  CHEST/LUNG: Good air entry, No wheezing  HEART: RRR, No murmurs  ABDOMEN: Soft, Bowel sounds present  EXTREMITIES:  No clubbing,

## 2019-07-13 NOTE — DISCHARGE NOTE PROVIDER - HOSPITAL COURSE
Pt is a 86 F w/ COPD, HTN, Hypothyroidism, Vitamin D deficiency, who presented with lightheadedness, a fall, and a near syncopal episode, found to have positive orthostatics, as well as a positive Urinalysis, and a Creatinine level of 1.7. She was admitted and was treated with IV fluids, and IV Rocephin, her antihypertensive medication Triamterne-HCTZ was  held due to the orthostatic blood pressure being low. Her Cr level went down to 1.5, and the patient was clinically improving,, we discontinued her antibiotics, she was having no sxs. She was seen by PT/Rehab and the pt was advised to follow up with her PMD in 1 week, she was advised not to resume her blood pressure medication.

## 2019-07-13 NOTE — DISCHARGE NOTE PROVIDER - CARE PROVIDER_API CALL
Juju Hodges[address], [place] [address]  Phone: [phone]  Fax: (   )    -  Follow Up Time: 1 week

## 2019-07-13 NOTE — DISCHARGE NOTE PROVIDER - PROVIDER TOKENS
FREE:[LAST:[Dr. Booth],FIRST:[Ginna],PHONE:[phone]],FAX:[(   )    -],ADDRESS:[[address] [place] [address] [address], [place] [address]],FOLLOWUP:[1 week]]

## 2019-07-25 DIAGNOSIS — E03.9 HYPOTHYROIDISM, UNSPECIFIED: ICD-10-CM

## 2019-07-25 DIAGNOSIS — N17.9 ACUTE KIDNEY FAILURE, UNSPECIFIED: ICD-10-CM

## 2019-07-25 DIAGNOSIS — E86.1 HYPOVOLEMIA: ICD-10-CM

## 2019-07-25 DIAGNOSIS — J44.9 CHRONIC OBSTRUCTIVE PULMONARY DISEASE, UNSPECIFIED: ICD-10-CM

## 2019-07-25 DIAGNOSIS — Z79.52 LONG TERM (CURRENT) USE OF SYSTEMIC STEROIDS: ICD-10-CM

## 2019-07-25 DIAGNOSIS — E86.0 DEHYDRATION: ICD-10-CM

## 2019-07-25 DIAGNOSIS — G31.83 NEUROCOGNITIVE DISORDER WITH LEWY BODIES: ICD-10-CM

## 2019-07-25 DIAGNOSIS — E55.9 VITAMIN D DEFICIENCY, UNSPECIFIED: ICD-10-CM

## 2019-07-25 DIAGNOSIS — N18.9 CHRONIC KIDNEY DISEASE, UNSPECIFIED: ICD-10-CM

## 2019-07-25 DIAGNOSIS — R55 SYNCOPE AND COLLAPSE: ICD-10-CM

## 2019-07-25 DIAGNOSIS — N30.00 ACUTE CYSTITIS WITHOUT HEMATURIA: ICD-10-CM

## 2019-07-25 DIAGNOSIS — T50.2X5A ADVERSE EFFECT OF CARBONIC-ANHYDRASE INHIBITORS, BENZOTHIADIAZIDES AND OTHER DIURETICS, INITIAL ENCOUNTER: ICD-10-CM

## 2019-07-25 DIAGNOSIS — R44.1 VISUAL HALLUCINATIONS: ICD-10-CM

## 2019-07-25 DIAGNOSIS — I12.9 HYPERTENSIVE CHRONIC KIDNEY DISEASE WITH STAGE 1 THROUGH STAGE 4 CHRONIC KIDNEY DISEASE, OR UNSPECIFIED CHRONIC KIDNEY DISEASE: ICD-10-CM

## 2019-07-25 DIAGNOSIS — F02.81 DEMENTIA IN OTHER DISEASES CLASSIFIED ELSEWHERE, UNSPECIFIED SEVERITY, WITH BEHAVIORAL DISTURBANCE: ICD-10-CM

## 2019-07-25 DIAGNOSIS — I95.1 ORTHOSTATIC HYPOTENSION: ICD-10-CM

## 2020-11-26 ENCOUNTER — INPATIENT (INPATIENT)
Facility: HOSPITAL | Age: 85
LOS: 2 days | Discharge: HOME HEALTH | End: 2020-11-29
Attending: HOSPITALIST | Admitting: HOSPITALIST
Payer: MEDICARE

## 2020-11-26 VITALS
SYSTOLIC BLOOD PRESSURE: 142 MMHG | TEMPERATURE: 98 F | DIASTOLIC BLOOD PRESSURE: 67 MMHG | WEIGHT: 274.92 LBS | RESPIRATION RATE: 16 BRPM | HEIGHT: 63 IN | OXYGEN SATURATION: 99 % | HEART RATE: 94 BPM

## 2020-11-26 DIAGNOSIS — Z90.710 ACQUIRED ABSENCE OF BOTH CERVIX AND UTERUS: Chronic | ICD-10-CM

## 2020-11-26 DIAGNOSIS — Z85.828 PERSONAL HISTORY OF OTHER MALIGNANT NEOPLASM OF SKIN: Chronic | ICD-10-CM

## 2020-11-26 DIAGNOSIS — Z90.49 ACQUIRED ABSENCE OF OTHER SPECIFIED PARTS OF DIGESTIVE TRACT: Chronic | ICD-10-CM

## 2020-11-26 LAB
ALBUMIN SERPL ELPH-MCNC: 4.1 G/DL — SIGNIFICANT CHANGE UP (ref 3.5–5.2)
ALP SERPL-CCNC: 70 U/L — SIGNIFICANT CHANGE UP (ref 30–115)
ALT FLD-CCNC: 23 U/L — SIGNIFICANT CHANGE UP (ref 0–41)
ANION GAP SERPL CALC-SCNC: 12 MMOL/L — SIGNIFICANT CHANGE UP (ref 7–14)
APPEARANCE UR: ABNORMAL
AST SERPL-CCNC: 56 U/L — HIGH (ref 0–41)
BACTERIA # UR AUTO: ABNORMAL
BASOPHILS # BLD AUTO: 0.04 K/UL — SIGNIFICANT CHANGE UP (ref 0–0.2)
BASOPHILS NFR BLD AUTO: 0.5 % — SIGNIFICANT CHANGE UP (ref 0–1)
BILIRUB SERPL-MCNC: 0.3 MG/DL — SIGNIFICANT CHANGE UP (ref 0.2–1.2)
BILIRUB UR-MCNC: NEGATIVE — SIGNIFICANT CHANGE UP
BUN SERPL-MCNC: 37 MG/DL — HIGH (ref 10–20)
CALCIUM SERPL-MCNC: 9 MG/DL — SIGNIFICANT CHANGE UP (ref 8.5–10.1)
CHLORIDE SERPL-SCNC: 108 MMOL/L — SIGNIFICANT CHANGE UP (ref 98–110)
CO2 SERPL-SCNC: 22 MMOL/L — SIGNIFICANT CHANGE UP (ref 17–32)
COLOR SPEC: YELLOW — SIGNIFICANT CHANGE UP
CREAT SERPL-MCNC: 1.8 MG/DL — HIGH (ref 0.7–1.5)
DIFF PNL FLD: ABNORMAL
EOSINOPHIL # BLD AUTO: 0.1 K/UL — SIGNIFICANT CHANGE UP (ref 0–0.7)
EOSINOPHIL NFR BLD AUTO: 1.3 % — SIGNIFICANT CHANGE UP (ref 0–8)
EPI CELLS # UR: 14 /HPF — HIGH (ref 0–5)
GLUCOSE SERPL-MCNC: 109 MG/DL — HIGH (ref 70–99)
GLUCOSE UR QL: NEGATIVE — SIGNIFICANT CHANGE UP
HCT VFR BLD CALC: 37.3 % — SIGNIFICANT CHANGE UP (ref 37–47)
HGB BLD-MCNC: 12 G/DL — SIGNIFICANT CHANGE UP (ref 12–16)
HYALINE CASTS # UR AUTO: 5 /LPF — SIGNIFICANT CHANGE UP (ref 0–7)
IMM GRANULOCYTES NFR BLD AUTO: 0.3 % — SIGNIFICANT CHANGE UP (ref 0.1–0.3)
KETONES UR-MCNC: NEGATIVE — SIGNIFICANT CHANGE UP
LEUKOCYTE ESTERASE UR-ACNC: ABNORMAL
LYMPHOCYTES # BLD AUTO: 0.79 K/UL — LOW (ref 1.2–3.4)
LYMPHOCYTES # BLD AUTO: 10.5 % — LOW (ref 20.5–51.1)
MCHC RBC-ENTMCNC: 30.3 PG — SIGNIFICANT CHANGE UP (ref 27–31)
MCHC RBC-ENTMCNC: 32.2 G/DL — SIGNIFICANT CHANGE UP (ref 32–37)
MCV RBC AUTO: 94.2 FL — SIGNIFICANT CHANGE UP (ref 81–99)
MONOCYTES # BLD AUTO: 0.7 K/UL — HIGH (ref 0.1–0.6)
MONOCYTES NFR BLD AUTO: 9.3 % — SIGNIFICANT CHANGE UP (ref 1.7–9.3)
NEUTROPHILS # BLD AUTO: 5.84 K/UL — SIGNIFICANT CHANGE UP (ref 1.4–6.5)
NEUTROPHILS NFR BLD AUTO: 78.1 % — HIGH (ref 42.2–75.2)
NITRITE UR-MCNC: POSITIVE
NRBC # BLD: 0 /100 WBCS — SIGNIFICANT CHANGE UP (ref 0–0)
PH UR: 6 — SIGNIFICANT CHANGE UP (ref 5–8)
PLATELET # BLD AUTO: 239 K/UL — SIGNIFICANT CHANGE UP (ref 130–400)
POTASSIUM SERPL-MCNC: 4.3 MMOL/L — SIGNIFICANT CHANGE UP (ref 3.5–5)
POTASSIUM SERPL-SCNC: 4.3 MMOL/L — SIGNIFICANT CHANGE UP (ref 3.5–5)
PROT SERPL-MCNC: 6.4 G/DL — SIGNIFICANT CHANGE UP (ref 6–8)
PROT UR-MCNC: ABNORMAL
RAPID RVP RESULT: SIGNIFICANT CHANGE UP
RBC # BLD: 3.96 M/UL — LOW (ref 4.2–5.4)
RBC # FLD: 14.6 % — HIGH (ref 11.5–14.5)
RBC CASTS # UR COMP ASSIST: 21 /HPF — HIGH (ref 0–4)
SARS-COV-2 RNA SPEC QL NAA+PROBE: SIGNIFICANT CHANGE UP
SODIUM SERPL-SCNC: 142 MMOL/L — SIGNIFICANT CHANGE UP (ref 135–146)
SP GR SPEC: 1.03 — SIGNIFICANT CHANGE UP (ref 1.01–1.03)
TROPONIN T SERPL-MCNC: <0.01 NG/ML — SIGNIFICANT CHANGE UP
UROBILINOGEN FLD QL: SIGNIFICANT CHANGE UP
WBC # BLD: 7.49 K/UL — SIGNIFICANT CHANGE UP (ref 4.8–10.8)
WBC # FLD AUTO: 7.49 K/UL — SIGNIFICANT CHANGE UP (ref 4.8–10.8)
WBC UR QL: 358 /HPF — HIGH (ref 0–5)

## 2020-11-26 PROCEDURE — 72125 CT NECK SPINE W/O DYE: CPT | Mod: 26

## 2020-11-26 PROCEDURE — 73502 X-RAY EXAM HIP UNI 2-3 VIEWS: CPT | Mod: 26,LT

## 2020-11-26 PROCEDURE — 70450 CT HEAD/BRAIN W/O DYE: CPT | Mod: 26

## 2020-11-26 PROCEDURE — 93010 ELECTROCARDIOGRAM REPORT: CPT

## 2020-11-26 PROCEDURE — 71045 X-RAY EXAM CHEST 1 VIEW: CPT | Mod: 26

## 2020-11-26 PROCEDURE — 99285 EMERGENCY DEPT VISIT HI MDM: CPT | Mod: CS

## 2020-11-26 PROCEDURE — 73562 X-RAY EXAM OF KNEE 3: CPT | Mod: 26,LT

## 2020-11-26 PROCEDURE — 73551 X-RAY EXAM OF FEMUR 1: CPT | Mod: 26,LT

## 2020-11-26 RX ORDER — CEFTRIAXONE 500 MG/1
1000 INJECTION, POWDER, FOR SOLUTION INTRAMUSCULAR; INTRAVENOUS EVERY 24 HOURS
Refills: 0 | Status: COMPLETED | OUTPATIENT
Start: 2020-11-26 | End: 2020-11-28

## 2020-11-26 RX ORDER — ACETAMINOPHEN 500 MG
650 TABLET ORAL ONCE
Refills: 0 | Status: COMPLETED | OUTPATIENT
Start: 2020-11-26 | End: 2020-11-26

## 2020-11-26 RX ORDER — SODIUM CHLORIDE 9 MG/ML
1000 INJECTION, SOLUTION INTRAVENOUS ONCE
Refills: 0 | Status: COMPLETED | OUTPATIENT
Start: 2020-11-26 | End: 2020-11-26

## 2020-11-26 RX ADMIN — SODIUM CHLORIDE 1000 MILLILITER(S): 9 INJECTION, SOLUTION INTRAVENOUS at 21:57

## 2020-11-26 RX ADMIN — Medication 650 MILLIGRAM(S): at 21:57

## 2020-11-26 RX ADMIN — Medication 650 MILLIGRAM(S): at 22:27

## 2020-11-26 RX ADMIN — CEFTRIAXONE 100 MILLIGRAM(S): 500 INJECTION, POWDER, FOR SOLUTION INTRAMUSCULAR; INTRAVENOUS at 22:56

## 2020-11-26 NOTE — ED PROVIDER NOTE - NS ED ROS FT
CONST: No fever, chills or bodyaches.   EYES: No pain, redness, drainage or visual changes.  ENT: No ear pain or discharge, nasal discharge or congestion. No sore throat  CARD: No chest pain, palpitations  RESP: No SOB, cough, hemoptysis. No hx of asthma or COPD  GI: No abdominal pain, N/V/D  : No urinary symptoms  MS: No joint pain, back pain or extremity pain/injury. (+) b/l lower extremity weakness with frequent falls.   SKIN: No rashes  NEURO: No headache, dizziness, paresthesias or LOC.

## 2020-11-26 NOTE — ED ADULT TRIAGE NOTE - CHIEF COMPLAINT QUOTE
Pt BIBEMS from home for increasing weakness in her legs over the last several weeks - pt reports 4 falls in the last week. She states she had an "MRI" outpatient after the falls b/c she hit her head, but it was negative. Pt denies LOC/decreased appetite/chest pain/sob/fever/chills.

## 2020-11-26 NOTE — ED ADULT NURSE NOTE - OBJECTIVE STATEMENT
Pt presented to ED c/o med sandra. Pt BIBA from home for increasing LE weakness x couple of weeks. Pt states she fell x4 last week. Pt able to MARIE, + pulses noted. Denies n/v/d/fevers/chills. Denies head trauma/ AC use. Pt A&Ox2 w/ periods of confusion. Fall precautions initiated.

## 2020-11-26 NOTE — ED PROVIDER NOTE - OBJECTIVE STATEMENT
89 y/o female with a PMH of HTN, COPD, and hypothyroidism presents to the ED for evaluation of frequent falls x 1 week, and b/l lower extremity weakness. pt she fell thursday (11/19), friday (11/20) and again monday (11/23) and tuesday (11/24). Pt reports she has left hip pain, and knee pain. pt reports she trips frequently, but is unsure of how she fell this week. pt reports hitting the left side of her head x 2. pt reports she walks without any assistance and live with her daughter. pt denies headache, dizziness, neck pain, back pain, visual changes, chest pain, sob, abdominal pain, n/v/d/c, urinary symptoms, blood in the stool, fever, chills, cough, palpitations, hx of seizures, illicit drug use, or use of alcohol.

## 2020-11-26 NOTE — ED PROVIDER NOTE - PHYSICAL EXAMINATION
Physical Exam    Vital Signs: I have reviewed the initial vital signs.  Constitutional: well-nourished, appears stated age, no acute distress  Eyes: Conjunctiva pink, Sclera clear, PERRLA, EOMI without pain.  Cardiovascular: S1 and S2, regular rate, regular rhythm, well-perfused extremities, radial and pedal pulses equal and 2+ b/l.   Respiratory: unlabored respiratory effort, clear to auscultation bilaterally no wheezing, rales and rhonchi. pt is speaking full sentences. no accessory muscle use. no reproducible chest tenderness or chest wall crepitus.   Gastrointestinal: soft, non-tender, nondistended abdomen, no pulsatile mass, normal bowl sounds, no rebound, no guarding, negative psoas, negative obturator, negative murphys. no organomegaly.   Musculoskeletal: supple neck, no lower extremity edema, no calf tenderness, no midline tenderness, no palpable spinal step offs. (+) FROM of b/l upper and lower extremities; however pain with flexion of the left knee.   Integumentary: warm, dry, no rash. no ecchymosis, abrasions, or lacerations.   Neurologic: awake, alert, cranial nerves II-XII grossly intact, extremities’ motor and sensory functions grossly intact. finger to nose intact. negative pronator drift.  Psychiatric: appropriate mood, appropriate affect

## 2020-11-26 NOTE — ED ADULT NURSE NOTE - NSIMPLEMENTINTERV_GEN_ALL_ED
Implemented All Fall with Harm Risk Interventions:  [place] to call system. Call bell, personal items and telephone within reach. Instruct patient to call for assistance. Room bathroom lighting operational. Non-slip footwear when patient is off stretcher. Physically safe environment: no spills, clutter or unnecessary equipment. Stretcher in lowest position, wheels locked, appropriate side rails in place. Provide visual cue, wrist band, yellow gown, etc. Monitor gait and stability. Monitor for mental status changes and reorient to person, place, and time. Review medications for side effects contributing to fall risk. Reinforce activity limits and safety measures with patient and family. Provide visual clues: red socks.

## 2020-11-26 NOTE — ED PROVIDER NOTE - CARE PLAN
Principal Discharge DX:	Hip pain, acute, left  Secondary Diagnosis:	Weakness  Secondary Diagnosis:	Knee pain, left   Principal Discharge DX:	Hip pain, acute, left  Secondary Diagnosis:	Weakness  Secondary Diagnosis:	Knee pain, left  Secondary Diagnosis:	UTI (urinary tract infection)   Principal Discharge DX:	Hip pain, acute, left  Secondary Diagnosis:	Weakness  Secondary Diagnosis:	Knee pain, left  Secondary Diagnosis:	UTI (urinary tract infection)  Secondary Diagnosis:	Frequent falls

## 2020-11-26 NOTE — ED PROVIDER NOTE - WET READ LAUNCH FT
There are no Wet Read(s) to document. There are 3 Wet Read(s) to document. There are 2 Wet Read(s) to document.

## 2020-11-26 NOTE — ED PROVIDER NOTE - ATTENDING CONTRIBUTION TO CARE
88 year old female with a pmh of COPD hypothyroid & HTN presents here for frequent falls. Patient states shes had several falls over the last 2 months , most recently had a fall yesterday 88 year old female with a pmh of COPD hypothyroid & HTN presents here for frequent falls. Patient states shes had several falls over the last 2 months , most recently had a fall yesterday ?head trauma no loc  not on anticoagulation & endorses left hip pain and left thigh pain. No fever chills cough cp sob n/v abdominal pain or urinary complaints. Patient lives at home with her mother and ambulates without assistance.  On exam  CONSTITUTIONAL: WA / WN / NAD  HEAD: NCAT  EYES: PERRL; EOMI;   ENT: Normal pharynx; mucous membranes pink/moist, no erythema.  NECK: Supple; no meningeal signs  CARD: RRR; nl S1/S2; no M/R/G.   RESP: Respiratory rate and effort are normal; breath sounds clear and equal bilaterally.  ABD: Soft, NT ND   MSK/EXT: No gross deformities; full range of motion. + ttp left hip and left medial thigh.  SKIN: Warm and dry;   NEURO: AAOx3  PSYCH: Memory Intact, Normal Affect

## 2020-11-26 NOTE — ED PROVIDER NOTE - CLINICAL SUMMARY MEDICAL DECISION MAKING FREE TEXT BOX
88 year old female with a pmh of COPD hypothyroid & HTN presents here for frequent falls. Patient states shes had several falls over the last 2 months , most recently had a fall yesterday ?head trauma no loc  not on anticoagulation & endorses left hip pain and left thigh pain. VS reviewed. Labs imaging ekg obtained and reviewed. Patient with no acute fx,. Patient with +UTI, antibiotics ordered. Patient admitted for UTI and frequent falls.

## 2020-11-27 PROBLEM — J44.9 CHRONIC OBSTRUCTIVE PULMONARY DISEASE, UNSPECIFIED: Chronic | Status: ACTIVE | Noted: 2019-07-10

## 2020-11-27 PROBLEM — E03.9 HYPOTHYROIDISM, UNSPECIFIED: Chronic | Status: ACTIVE | Noted: 2019-07-10

## 2020-11-27 PROBLEM — I10 ESSENTIAL (PRIMARY) HYPERTENSION: Chronic | Status: ACTIVE | Noted: 2019-07-10

## 2020-11-27 LAB
ANION GAP SERPL CALC-SCNC: 11 MMOL/L — SIGNIFICANT CHANGE UP (ref 7–14)
BASOPHILS # BLD AUTO: 0.07 K/UL — SIGNIFICANT CHANGE UP (ref 0–0.2)
BASOPHILS NFR BLD AUTO: 1.2 % — HIGH (ref 0–1)
BUN SERPL-MCNC: 31 MG/DL — HIGH (ref 10–20)
CALCIUM SERPL-MCNC: 8.3 MG/DL — LOW (ref 8.5–10.1)
CHLORIDE SERPL-SCNC: 110 MMOL/L — SIGNIFICANT CHANGE UP (ref 98–110)
CO2 SERPL-SCNC: 21 MMOL/L — SIGNIFICANT CHANGE UP (ref 17–32)
CREAT SERPL-MCNC: 1.4 MG/DL — SIGNIFICANT CHANGE UP (ref 0.7–1.5)
EOSINOPHIL # BLD AUTO: 0.21 K/UL — SIGNIFICANT CHANGE UP (ref 0–0.7)
EOSINOPHIL NFR BLD AUTO: 3.6 % — SIGNIFICANT CHANGE UP (ref 0–8)
GLUCOSE SERPL-MCNC: 87 MG/DL — SIGNIFICANT CHANGE UP (ref 70–99)
HCT VFR BLD CALC: 35.5 % — LOW (ref 37–47)
HGB BLD-MCNC: 11.1 G/DL — LOW (ref 12–16)
IMM GRANULOCYTES NFR BLD AUTO: 0.2 % — SIGNIFICANT CHANGE UP (ref 0.1–0.3)
LYMPHOCYTES # BLD AUTO: 1.29 K/UL — SIGNIFICANT CHANGE UP (ref 1.2–3.4)
LYMPHOCYTES # BLD AUTO: 21.9 % — SIGNIFICANT CHANGE UP (ref 20.5–51.1)
MAGNESIUM SERPL-MCNC: 2.2 MG/DL — SIGNIFICANT CHANGE UP (ref 1.8–2.4)
MCHC RBC-ENTMCNC: 30.1 PG — SIGNIFICANT CHANGE UP (ref 27–31)
MCHC RBC-ENTMCNC: 31.3 G/DL — LOW (ref 32–37)
MCV RBC AUTO: 96.2 FL — SIGNIFICANT CHANGE UP (ref 81–99)
MONOCYTES # BLD AUTO: 0.57 K/UL — SIGNIFICANT CHANGE UP (ref 0.1–0.6)
MONOCYTES NFR BLD AUTO: 9.7 % — HIGH (ref 1.7–9.3)
NEUTROPHILS # BLD AUTO: 3.75 K/UL — SIGNIFICANT CHANGE UP (ref 1.4–6.5)
NEUTROPHILS NFR BLD AUTO: 63.4 % — SIGNIFICANT CHANGE UP (ref 42.2–75.2)
NRBC # BLD: 0 /100 WBCS — SIGNIFICANT CHANGE UP (ref 0–0)
PLATELET # BLD AUTO: 227 K/UL — SIGNIFICANT CHANGE UP (ref 130–400)
POTASSIUM SERPL-MCNC: 4 MMOL/L — SIGNIFICANT CHANGE UP (ref 3.5–5)
POTASSIUM SERPL-SCNC: 4 MMOL/L — SIGNIFICANT CHANGE UP (ref 3.5–5)
RBC # BLD: 3.69 M/UL — LOW (ref 4.2–5.4)
RBC # FLD: 14.6 % — HIGH (ref 11.5–14.5)
SODIUM SERPL-SCNC: 142 MMOL/L — SIGNIFICANT CHANGE UP (ref 135–146)
WBC # BLD: 5.9 K/UL — SIGNIFICANT CHANGE UP (ref 4.8–10.8)
WBC # FLD AUTO: 5.9 K/UL — SIGNIFICANT CHANGE UP (ref 4.8–10.8)

## 2020-11-27 PROCEDURE — 99233 SBSQ HOSP IP/OBS HIGH 50: CPT

## 2020-11-27 RX ORDER — SODIUM CHLORIDE 9 MG/ML
1000 INJECTION INTRAMUSCULAR; INTRAVENOUS; SUBCUTANEOUS
Refills: 0 | Status: DISCONTINUED | OUTPATIENT
Start: 2020-11-27 | End: 2020-11-28

## 2020-11-27 RX ORDER — LEVOTHYROXINE SODIUM 125 MCG
125 TABLET ORAL DAILY
Refills: 0 | Status: DISCONTINUED | OUTPATIENT
Start: 2020-11-27 | End: 2020-11-29

## 2020-11-27 RX ORDER — RISPERIDONE 4 MG/1
0.5 TABLET ORAL
Refills: 0 | Status: DISCONTINUED | OUTPATIENT
Start: 2020-11-27 | End: 2020-11-29

## 2020-11-27 RX ORDER — NYSTATIN CREAM 100000 [USP'U]/G
1 CREAM TOPICAL
Refills: 0 | Status: DISCONTINUED | OUTPATIENT
Start: 2020-11-27 | End: 2020-11-29

## 2020-11-27 RX ORDER — CHLORHEXIDINE GLUCONATE 213 G/1000ML
1 SOLUTION TOPICAL DAILY
Refills: 0 | Status: DISCONTINUED | OUTPATIENT
Start: 2020-11-27 | End: 2020-11-29

## 2020-11-27 RX ORDER — INFLUENZA VIRUS VACCINE 15; 15; 15; 15 UG/.5ML; UG/.5ML; UG/.5ML; UG/.5ML
0.5 SUSPENSION INTRAMUSCULAR ONCE
Refills: 0 | Status: DISCONTINUED | OUTPATIENT
Start: 2020-11-27 | End: 2020-11-28

## 2020-11-27 RX ADMIN — SODIUM CHLORIDE 75 MILLILITER(S): 9 INJECTION INTRAMUSCULAR; INTRAVENOUS; SUBCUTANEOUS at 02:14

## 2020-11-27 RX ADMIN — NYSTATIN CREAM 1 APPLICATION(S): 100000 CREAM TOPICAL at 18:00

## 2020-11-27 RX ADMIN — SODIUM CHLORIDE 75 MILLILITER(S): 9 INJECTION INTRAMUSCULAR; INTRAVENOUS; SUBCUTANEOUS at 12:37

## 2020-11-27 RX ADMIN — RISPERIDONE 0.5 MILLIGRAM(S): 4 TABLET ORAL at 18:00

## 2020-11-27 RX ADMIN — Medication 125 MICROGRAM(S): at 05:52

## 2020-11-27 RX ADMIN — RISPERIDONE 0.5 MILLIGRAM(S): 4 TABLET ORAL at 05:52

## 2020-11-27 RX ADMIN — NYSTATIN CREAM 1 APPLICATION(S): 100000 CREAM TOPICAL at 05:52

## 2020-11-27 RX ADMIN — CEFTRIAXONE 100 MILLIGRAM(S): 500 INJECTION, POWDER, FOR SOLUTION INTRAMUSCULAR; INTRAVENOUS at 22:08

## 2020-11-27 NOTE — H&P ADULT - ASSESSMENT
Mrs. Booker is a 87 y/o female with a PMHx of HTN, COPD, and hypothyroidism admitted for evaluation of frequent falls and found to have a UTI.    #Urinary Tract Infection  - UA positive on admission  - Started on rocephin 1000 mg x 3 days  - Started on gentle hydration - NS @ 75 ml/hr  - follow up urine culture results    #Frequent falls most likely mechanical falls/age related vs medication induced vs orthostatic hypotension (less likely)  - In ED trauma work up negative  (CT head, CT pelvis, Xray Hip)  - follow up orthostatic vitals  - review medication list and d/c potential medication induced falls  - follow up with Pt/rehab  - fall precautions  - f/u cpk levels    #HTN (controlled)    #COPD    #Hypothyroidism  -continue levothyroxine 75 mcg      Diet:DASH  Activity: bed rest  DVT Prophylaxis: SCD  CHG Order  Code Status: full code  Disposition: from home; would benefit from home services/rehab   Mrs. Booker is a 89 y/o female with a PMHx of HTN, COPD, blepharospasm, vitamin d deficiency, hypothyroidism admitted for evaluation of frequent falls and found to have a UTI on admission.    #Frequent falls most likely mechanical falls/age related vs medication induced vs orthostatic hypotension (less likely)  - In ED trauma work up negative  (CT head, CT pelvis, Xray Hip)  - follow up orthostatic vitals  - started on gentle hydration - NS @ 75ml/hr  - review medication list and d/c potential medication induced falls  - follow up with Pt/rehab  - fall precautions  - f/u cpk levels    #Acute cystitis  - U/A significantly positive on admission with many bacteria, wbc of 358, positive nitrites, and large leukocyte esterase.  - Pt endorses frequency, denies burning  - Rocephin [id number] until Cx are back  - Started on rocephin 1000 mg x 3 days until cultures are back  - Started on gentle hydration - NS @ 75 ml/hr  - follow up urine culture results    # Hypertension (controlled)  - Blood pressure controlled/hypotensive on presentation  - review meds    #Hypothyroidism  -C/w synthroid 125mcg  - f/u tsh, t4, t3 levels    #Dementia, possibly with Lewy bodies  -Per family patient has visual hallucination, memory loss in the past year  -Has seen outpatient Neuruologists, not on home medications for now    #History of b/l blepharospasm  -refractory to Botox injections x 3, patient wears sunglasses due to discomfort    #Vit D deficiency  -C/w vit D supplements    #COPD - stable  - No wheezing on physical exam; not in exacerbation  - CXR stable      Diet:DASH  Activity: bed rest  DVT Prophylaxis: SCD  Code Status: full code  Disposition: from home; would benefit from home services/rehab   Mrs. Booker is a 87 y/o female with a PMHx of HTN, COPD, blepharospasm, vitamin d deficiency, hypothyroidism admitted for evaluation of frequent falls and found to have a UTI on admission.    #Frequent falls most likely mechanical falls/age related vs medication induced vs orthostatic hypotension (less likely)  - In ED trauma work up negative  (CT head, CT pelvis, Xray Hip)  - follow up orthostatic vitals  - started on gentle hydration - NS @ 75ml/hr  - review medication list and d/c potential medication induced falls  - follow up with Pt/rehab  - fall precautions  - f/u cpk levels    #Acute cystitis  - U/A significantly positive on admission with many bacteria, wbc of 358, positive nitrites, and large leukocyte esterase.  - Pt endorses frequency, denies burning  - Rocephin [id number] until Cx are back  - Started on rocephin 1000 mg x 3 days until cultures are back  - Started on gentle hydration - NS @ 75 ml/hr  - follow up urine culture results      #Acute Kidney injury, possibly prerenal from dehyration vs CKD  - Cr 1.8 on admission, baseline creatinine 1.5   - likely pre-renal in etiology  - continue with gentle hydration NS @ 75ml/hr  - Monitor BMP daily on IVFs    # Hypertension (controlled)  - Blood pressure controlled/hypotensive on presentation  - review meds    #Hypothyroidism  -C/w synthroid 125mcg  - f/u tsh, t4, t3 levels    #Dementia, possibly with Lewy bodies  -Per family patient has visual hallucination, memory loss in the past year  -Has seen outpatient Neuruologists, not on home medications for now    #History of b/l blepharospasm  -refractory to Botox injections x 3, patient wears sunglasses due to discomfort    #Vit D deficiency  -C/w vit D supplements    #COPD - stable  - No wheezing on physical exam; not in exacerbation  - CXR stable      Diet:DASH  Activity: bed rest  DVT Prophylaxis: SCD  Code Status: full code  Disposition: from home; would benefit from home services/rehab   Mrs. Booker is a 87 y/o female with a PMHx of HTN, COPD, blepharospasm, vitamin d deficiency, hypothyroidism admitted for evaluation of frequent falls and found to have a UTI on admission.    #Frequent falls most likely mechanical falls/age related vs medication induced vs orthostatic hypotension (less likely)  - In ED trauma work up negative  (CT head, CT pelvis, Xray Hip)  - follow up orthostatic vitals  - started on gentle hydration - NS @ 75ml/hr  - review medication list and d/c potential medication induced falls  - follow up with Pt/rehab  - fall precautions  - f/u cpk levels    #Acute cystitis  - U/A significantly positive on admission with many bacteria, wbc of 358, positive nitrites, and large leukocyte esterase.  - Pt endorses frequency, denies burning  - Rocephin [id number] until Cx are back  - Started on rocephin 1000 mg x 3 days until cultures are back  - Started on gentle hydration - NS @ 75 ml/hr  - follow up urine culture results    #Acute Kidney injury, possibly prerenal from dehyration vs CKD  - Cr 1.8 on admission, baseline creatinine 1.5   - likely pre-renal in etiology  - continue with gentle hydration NS @ 75ml/hr  - Monitor BMP daily on IVFs    # Hypertension (controlled)  - Blood pressure controlled; reports she is not taking any blood pressure medications as she was previous admitted last year for similar reasons and Triamterene-HCTZ 25/37.5mg was discontinued  - follow up routine vital signs    #Hypothyroidism  -C/w synthroid 125mcg  - f/u tsh, t4, t3 levels    #Dementia, possibly with Lewy bodies  -Per family patient has visual hallucination, memory loss in the past year  -Has seen outpatient Neurologists not on home medications for now    #History of b/l blepharospasm  -refractory to Botox injections x 3, patient wears sunglasses due to discomfort however reports she left them at home as she was rushed to the hospital    #Vit D deficiency  -C/w vit D supplements    #COPD - stable  - No wheezing on physical exam; not in exacerbation  - currently not on any copd medications    Diet:DASH  Activity: bed rest  DVT Prophylaxis: SCD  Code Status: full code  Disposition: from home; would benefit from home services/rehab   Mrs. Booker is a 89 y/o female with a PMHx of HTN, COPD, blepharospasm, vitamin d deficiency, hypothyroidism admitted for evaluation of frequent falls and found to have a UTI on admission.    #Frequent falls most likely mechanical falls/age related vs medication induced vs orthostatic hypotension (less likely)  - In ED trauma work up negative  (CT head, CT neck/cervical spine, Xray hip/knee/pelvis)  - follow up orthostatic vitals  - started on gentle hydration - NS @ 75ml/hr  - review medication list and d/c potential medication induced falls  - follow up with Pt/rehab  - fall precautions  - f/u cpk levels    #Acute cystitis  - U/A significantly positive on admission with many bacteria, wbc of 358, positive nitrites, and large leukocyte esterase.  - Pt endorses frequency, denies burning  - Rocephin [id number] until Cx are back  - Started on rocephin 1000 mg x 3 days until cultures are back  - Started on gentle hydration - NS @ 75 ml/hr  - follow up urine culture results    #Acute Kidney injury, possibly prerenal from dehyration vs CKD  - Cr 1.8 on admission, baseline creatinine 1.5   - likely pre-renal in etiology  - continue with gentle hydration NS @ 75ml/hr  - Monitor BMP daily on IVFs    # Hypertension (controlled)  - Blood pressure controlled; reports she is not taking any blood pressure medications as she was previous admitted last year for similar reasons and Triamterene-HCTZ 25/37.5mg was discontinued  - follow up routine vital signs    #Hypothyroidism  -C/w synthroid 125mcg  - f/u tsh, t4, t3 levels    #Dementia, possibly with Lewy bodies  -Per family patient has visual hallucination, memory loss in the past year  -Has seen outpatient Neurologists not on home medications for now    #History of b/l blepharospasm  -refractory to Botox injections x 3, patient wears sunglasses due to discomfort however reports she left them at home as she was rushed to the hospital    #Vit D deficiency  -C/w vit D supplements    #COPD - stable  - No wheezing on physical exam; not in exacerbation  - currently not on any copd medications    Diet:DASH  Activity: bed rest  DVT Prophylaxis: SCD  Code Status: full code  Disposition: from home; would benefit from home services/rehab   Mrs. Booker is a 89 y/o female with a PMHx of HTN, COPD, blepharospasm, vitamin d deficiency, hypothyroidism admitted for evaluation of frequent falls and found to have a UTI on admission.    #Frequent falls most likely mechanical falls/age related vs medication induced vs orthostatic hypotension (less likely)  - In ED trauma work up negative  (CT head, CT neck/cervical spine, Xray hip/knee/pelvis)  - follow up orthostatic vitals  - started on gentle hydration - NS @ 75ml/hr  - review medication list and d/c potential medication induced falls  - follow up with Pt/rehab  - fall precautions  - f/u cpk levels    #Acute cystitis  - U/A significantly positive on admission with many bacteria, wbc of 358, positive nitrites, and large leukocyte esterase.  - Pt endorses frequency, denies burning  - Rocephin [id number] until Cx are back  - Started on rocephin 1000 mg x 3 days until cultures are back  - Started on gentle hydration - NS @ 75 ml/hr  - follow up urine culture results    #Acute Kidney injury, possibly prerenal from dehyration vs CKD  - Cr 1.8 on admission, baseline creatinine 1.5   - likely pre-renal in etiology  - continue with gentle hydration NS @ 75ml/hr  - Monitor BMP daily on IVFs    # Hypertension (controlled)  - Blood pressure controlled; reports she is not taking any blood pressure medications as she was previous admitted last year for similar reasons and Triamterene-HCTZ 25/37.5mg was discontinued  - follow up routine vital signs    #Hypothyroidism  -C/w synthroid 125mcg  - f/u tsh, t4, t3 levels    #Dementia, possibly with Lewy bodies  -Per family patient has visual hallucination, memory loss in the past year  -Has seen outpatient Neurologists Dr. Agusto Solorzano   - Risperidone 0.5 mg twice a day    #History of b/l blepharospasm  -refractory to Botox injections x 3, patient wears sunglasses due to discomfort however reports she left them at home as she was rushed to the hospital    #Vit D deficiency  -C/w vit D supplements    #COPD - stable  - No wheezing on physical exam; not in exacerbation  - currently not on any copd medications    Diet:DASH  Activity: bed rest  DVT Prophylaxis: SCD  Code Status: DNR/DNI  Disposition: from home; would benefit from home services/rehab   Mrs. Booker is a 87 y/o female with a PMHx of HTN, COPD, blepharospasm, vitamin d deficiency, hypothyroidism admitted for evaluation of frequent falls and found to have a UTI on admission.    #Frequent falls most likely mechanical falls/age related vs medication induced vs orthostatic hypotension (less likely)  - In ED trauma work up negative  (CT head, CT neck/cervical spine, Xray hip/knee/pelvis)  - follow up orthostatic vitals  - started on gentle hydration - NS @ 75ml/hr  - review medication list and d/c potential medication induced falls  - follow up with Pt/rehab  - fall precautions  - f/u cpk levels    #Acute cystitis  - U/A significantly positive on admission with many bacteria, wbc of 358, positive nitrites, and large leukocyte esterase.  - Pt endorses frequency, denies burning  - Started on Rocephin 1000 mg x 3 days until cultures are back  - Started on gentle hydration - NS @ 75 ml/hr  - follow up urine culture results    #Acute Kidney injury, possibly prerenal from dehyration vs CKD  - Cr 1.8 on admission, baseline creatinine 1.5   - likely pre-renal in etiology  - continue with gentle hydration NS @ 75ml/hr  - Monitor BMP daily on IVFs    # Hypertension (controlled)  - Blood pressure controlled; reports she is not taking any blood pressure medications as she was previous admitted last year for similar reasons and Triamterene-HCTZ 25/37.5mg was discontinued  - follow up routine vital signs    #Hypothyroidism  -C/w synthroid 125mcg  - f/u tsh, t4, t3 levels    #Dementia, possibly with Lewy bodies  -Per family patient has visual hallucination, memory loss in the past year  -Has seen outpatient Neurologists Dr. Agusto Solorzano   - Risperidone 0.5 mg twice a day    #History of b/l blepharospasm  -refractory to Botox injections x 3, patient wears sunglasses due to discomfort however reports she left them at home as she was rushed to the hospital    #Vit D deficiency  -C/w vit D supplements    #COPD - stable  - No wheezing on physical exam; not in exacerbation  - currently not on any copd medications    Diet:DASH  Activity: bed rest  DVT Prophylaxis: SCD  Code Status: DNR/DNI  Disposition: from home; would benefit from home services/rehab   Mrs. Booker is a 89 y/o female with a PMHx of HTN, COPD, blepharospasm, vitamin d deficiency, hypothyroidism admitted for evaluation of frequent falls and found to have a UTI on admission.    #Frequent falls most likely mechanical falls/age related vs medication induced vs orthostatic hypotension (less likely)  - In ED trauma work up negative  (CT head, CT neck/cervical spine, Xray hip/knee/pelvis)  - follow up orthostatic vitals  - started on gentle hydration - NS @ 75ml/hr  - review medication list and d/c potential medication induced falls  - follow up with Pt/rehab  - fall precautions  - f/u cpk levels    #Acute cystitis  - U/A significantly positive on admission with many bacteria, wbc of 358, positive nitrites, and large leukocyte esterase.  - Pt endorses frequency, denies burning  - Started on Rocephin 1000 mg x 3 days until cultures are back  - Started on gentle hydration - NS @ 75 ml/hr  - follow up urine culture results    #Acute Kidney injury, possibly prerenal from dehyration vs CKD  - Cr 1.8 on admission, baseline creatinine 1.5   - likely pre-renal in etiology  - continue with gentle hydration NS @ 75ml/hr  - Monitor BMP daily on IVFs    # Hypertension (controlled)  - Blood pressure controlled; reports she is not taking any blood pressure medications as she was previous admitted last year for similar reasons and Triamterene-HCTZ 25/37.5mg was discontinued (daughter also mentioned the same)  - follow up routine vital signs    #Hypothyroidism  -C/w synthroid 125mcg  - f/u tsh, t4, t3 levels    #Dementia, possibly with Lewy bodies  -Per daughter patient has visual hallucination and memory loss in the past year  - Has seen outpatient Neurologists Dr. Agusto Gordon   - continue with risperidone 0.5 mg twice a day    #History of b/l blepharospasm  -refractory to Botox injections x 3, patient wears sunglasses due to discomfort however reports she left them at home as she was rushed to the hospital    #Vit D deficiency  -C/w vit D supplements    #COPD - stable  - No wheezing on physical exam; not in exacerbation  - currently not on any copd medications    Diet:DASH  Activity: bed rest  DVT Prophylaxis: SCD  Code Status: DNR/DNI  Disposition: from home; would benefit from home services/rehab

## 2020-11-27 NOTE — PROGRESS NOTE ADULT - ATTENDING COMMENTS
Mrs. Booker is a 89 y/o female with a PMHx of HTN, COPD, blepharospasm, vitamin d deficiency, hypothyroidism admitted for evaluation of frequent falls and found to have a UTI on admission.    #Frequent falls most likely multifactorial mechanical/medication induced vs orthostatics vs deconditioning  trauma work up negative    f/u orthostatic vitals  c/w NS 75ml/hr  PT eval  fall precautions    #Acute cystitis  UA positive: many bacteria, wbc of 358, positive nitrites, and large leukocyte esterase although dirty catch   has frequency, denies burning  c/w Rocephin 1000 mg qd for now  f/u UCx  f/u BCx    #Acute Kidney injury, possibly prerenal from dehyration vs CKD  Cr 1.8 on admission, baseline creatinine 1.5   c/w NS  75ml/hr    #Hypertension (controlled)  /56 today, not on any meds  Triamterene-HCTZ 25/37.5mg was discontinued on last admission   Monitor BP     #Hypothyroidism  c/w synthroid 125mcg  f/u tsh, t4, t3 levels    #Dementia, possibly with Lewy bodies vs Delirium   Per daughter patient has visual hallucination and memory loss in the past year  Has seen outpatient Neurologists Dr. Agusto Gordon   c/w risperidone 0.5 mg twice a day    #History of b/l blepharospasm  refractory to Botox injections x 3  patient wears sunglasses due to discomfort     #Vit D deficiency  c/w vit D supplements    #COPD - stable  no wheezing  currently not on any copd medications - monitor    I saw and evaluated the patient on the above date of service.  I personally spent 35 minutes on the total encounter.  More than 50% of this time was spent on counseling and/or coordination of care.   I agree with the above history, physical, and plan which I have reviewed and edited where appropriate.

## 2020-11-27 NOTE — H&P ADULT - NSICDXPASTMEDICALHX_GEN_ALL_CORE_FT
PAST MEDICAL HISTORY:  Blepharospasm     COPD (chronic obstructive pulmonary disease)     HTN (hypertension)     Hypothyroid     Vitamin D deficiency

## 2020-11-27 NOTE — SWALLOW BEDSIDE ASSESSMENT ADULT - SLP PERTINENT HISTORY OF CURRENT PROBLEM
pt is an 87 y/o F w/ PMHx: HTN, COPD, blepharospasm, vitamin d deficiency, hypothyroidism admitted for evaluation of frequent falls and found to have a UTI on admission. pt being treated for frequent falls most likely mechanical falls/age related vs medication induced vs orthostatic hypotension. SLP c/s 2' pt lethargic and to assess for PO diet.

## 2020-11-27 NOTE — PHYSICAL THERAPY INITIAL EVALUATION ADULT - SPECIFY REASON(S)
9:35. PT attempted to see pt, pt was sleeping on arrival and despite maximum arousal, pt did not want to wake up at this time. PT to f/u .

## 2020-11-27 NOTE — PROGRESS NOTE ADULT - SUBJECTIVE AND OBJECTIVE BOX
CATALINA COOK 88y Female  MRN#: [id number]   CODE STATUS: DNR/DNI      SUBJECTIVE  Patient is a 88y old Female who presents with a chief complaint of Currently admitted to medicine with the primary diagnosis of Hip pain, acute, left    Today is hospital day 1d, and this morning she is sleeping in bed difficult to arise.   No overnight events.       OBJECTIVE  PAST MEDICAL & SURGICAL HISTORY  Blepharospasm    Vitamin D deficiency    Hypothyroid    COPD (chronic obstructive pulmonary disease)    HTN (hypertension)    History of skin cancer    H/O total hysterectomy    History of cholecystectomy      ALLERGIES:  No Known Allergies    MEDICATIONS:  STANDING MEDICATIONS  cefTRIAXone   IVPB 1000 milliGRAM(s) IV Intermittent every 24 hours  chlorhexidine 4% Liquid 1 Application(s) Topical daily  influenza   Vaccine 0.5 milliLiter(s) IntraMuscular once  levothyroxine 125 MICROGram(s) Oral daily  nystatin Powder 1 Application(s) Topical two times a day  risperiDONE   Tablet 0.5 milliGRAM(s) Oral two times a day  sodium chloride 0.9%. 1000 milliLiter(s) IV Continuous <Continuous>    PRN MEDICATIONS      VITAL SIGNS: Last 24 Hours  T(C): 35.8 (2020 06:13), Max: 36.9 (2020 18:17)  T(F): 96.5 (2020 06:13), Max: 98.4 (2020 18:17)  HR: 86 (2020 08:07) (86 - 94)  BP: 142/64 (2020 08:07) (121/56 - 142/67)  BP(mean): 92 (2020 08:07) (92 - 92)  RR: 18 (2020 08:07) (16 - 20)  SpO2: 95% (2020 08:07) (95% - 99%)    LABS:                        11.1   5.90  )-----------( 227      ( 2020 05:47 )             35.5         142  |  110  |  31<H>  ----------------------------<  87  4.0   |  21  |  1.4    Ca    8.3<L>      2020 05:47  Mg     2.2         TPro  6.4  /  Alb  4.1  /  TBili  0.3  /  DBili  x   /  AST  56<H>  /  ALT  23  /  AlkPhos  70        Urinalysis Basic - ( 2020 22:03 )    Color: Yellow / Appearance: Slightly Turbid / S.028 / pH: x  Gluc: x / Ketone: Negative  / Bili: Negative / Urobili: <2 mg/dL   Blood: x / Protein: 30 mg/dL / Nitrite: Positive   Leuk Esterase: Large / RBC: 21 /HPF /  /HPF   Sq Epi: x / Non Sq Epi: 14 /HPF / Bacteria: Many        Troponin T, Serum: <0.01 ng/mL (20 @ 19:50)      CARDIAC MARKERS ( 2020 19:50 )  x     / <0.01 ng/mL / x     / x     / x          RADIOLOGY:      PHYSICAL EXAM:  GENERAL: NAD, well-developed, lethargic in the AM   HEENT:  Atraumatic, Normocephalic. EOMI, conjunctiva and sclera clear, No JVD  PULMONARY: CTA bilaterally, no wheeze   CARDIOVASCULAR: Regular rate and rhythm; No murmurs, rubs, or gallops  GASTROINTESTINAL: Soft, Nontender, Nondistended; Bowel sounds present  MUSCULOSKELETAL: No clubbing, cyanosis, or edema  NEUROLOGY: non-focal  SKIN: No rashes or lesions      ASSESSMENT & PLAN  Mrs. Cook is a 89 y/o female with a PMHx of HTN, COPD, blepharospasm, vitamin d deficiency, hypothyroidism admitted for evaluation of frequent falls and found to have a UTI on admission.    #Frequent falls most likely mechanical falls/age related vs medication induced vs orthostatic hypotension  - In ED trauma work up negative  (CT head, CT neck/cervical spine, Xray hip/knee/pelvis)  - follow up orthostatic vitals  - continue gentle hydration - NS 75ml/hr  - follow up with Pt/rehab  - fall precautions  - f/u cpk levels    #Acute cystitis  - UA positive: many bacteria, wbc of 358, positive nitrites, and large leukocyte esterase.  - Pt endorses frequency, denies burning  - Started on Rocephin 1000 mg qd  - follow up urine culture results  - Bcx sent     #Acute Kidney injury, possibly prerenal from dehyration vs CKD  - Cr 1.8 on admission, baseline creatinine 1.5   - continue with gentle hydration NS  75ml/hr    # Hypertension (controlled)  - /56 today, not on any meds  - Triamterene-HCTZ 25/37.5mg was discontinued on last admission   - continue to monitor     #Hypothyroidism  -C/w synthroid 125mcg  - f/u tsh, t4, t3 levels    #Dementia, possibly with Lewy bodies vs Delirium   - Per daughter patient has visual hallucination and memory loss in the past year  - Has seen outpatient Neurologists Dr. Agusto Gordon   - continue with risperidone 0.5 mg twice a day    #History of b/l blepharospasm  -refractory to Botox injections x 3  -patient wears sunglasses due to discomfort     #Vit D deficiency  -C/w vit D supplements    #COPD - stable  - No wheezing on physical exam; not in exacerbation  - currently not on any copd medications    Diet:DASH  Activity: bed rest  DVT Prophylaxis: SCD  Code Status: DNR/DNI  Disposition: from home; would benefit from home services/rehab

## 2020-11-27 NOTE — PHYSICAL THERAPY INITIAL EVALUATION ADULT - GENERAL OBSERVATIONS, REHAB EVAL
1:50-2:20. Encountered pt semifowler in bed in NAD, +IV locked. Pt willing to participate and able to stand x2 and take 5 side steps along side of bed with modA. Pt /52 supine, 100/59 sitting. Upon standing pt reported dizziness that was relieved with seated rest. 13:50-14:20. Encountered pt semifowler in bed in NAD, +IV locked. Pt willing to participate and able to stand x2 and take 5 side steps along side of bed with modA. Pt /52 supine, 100/59 sitting. Upon standing pt reported dizziness that was relieved with seated rest.

## 2020-11-27 NOTE — H&P ADULT - NSHPPHYSICALEXAM_GEN_ALL_CORE
· Physical Examination: Physical Exam    	Vital Signs: I have reviewed the initial vital signs.  	Constitutional: well-nourished, appears stated age, no acute distress  	Eyes: Conjunctiva pink, Sclera clear, PERRLA, EOMI without pain.  	Cardiovascular: S1 and S2, regular rate, regular rhythm, well-perfused extremities, radial and pedal pulses equal and 2+ b/l.   	Respiratory: unlabored respiratory effort, clear to auscultation bilaterally no wheezing, rales and rhonchi. pt is speaking full sentences. no accessory muscle use. no reproducible chest tenderness or chest wall crepitus.   	Gastrointestinal: soft, non-tender, nondistended abdomen, no pulsatile mass, normal bowl sounds, no rebound, no guarding, negative psoas, negative obturator, negative murphys. no organomegaly.   	Musculoskeletal: supple neck, no lower extremity edema, no calf tenderness, no midline tenderness, no palpable spinal step offs. (+) FROM of b/l upper and lower extremities; however pain with flexion of the left knee.   	Integumentary: warm, dry, no rash. no ecchymosis, abrasions, or lacerations.   	Neurologic: awake, alert, cranial nerves II-XII grossly intact, extremities’ motor and sensory functions grossly intact. finger to nose intact. negative pronator drift.  Psychiatric: appropriate mood, appropriate affect GENERAL: NAD, well-developed, AAOx2/3  HEENT: Atraumatic, Normocephalic. EOMI, PERRLA, conjunctiva and sclera clear,No JVD  PULMONARY: Clear to auscultation bilaterally; No wheeze  CARDIOVASCULAR: Regular rate and rhythm; No murmurs, rubs, or gallops  GASTROINTESTINAL: Soft, Nontender, Nondistended; Bowel sounds present  MUSCULOSKELETAL: 2+ Peripheral Pulses, No clubbing, cyanosis, or edema  NEUROLOGY: non-focal, no tremor or asterixis  SKIN: No rashes or lesions

## 2020-11-27 NOTE — PHYSICAL THERAPY INITIAL EVALUATION ADULT - PERTINENT HX OF CURRENT PROBLEM, REHAB EVAL
Pt is an 87 y/o female with a PMHx of HTN, COPD, vitamin d deficiency, blepharospasm and hypothyroidism presents to the ED for evaluation of frequent falls x 1 week and b/l lower extremity weakness. Patient reports she fell thursday (11/19), friday (11/20) and again monday (11/23) and tuesday (11/24). During the most recent fall, she reports she fell on her left side and hit the left side of her head and has left hip and knee pain.

## 2020-11-28 LAB
ALBUMIN SERPL ELPH-MCNC: 3.4 G/DL — LOW (ref 3.5–5.2)
ALP SERPL-CCNC: 57 U/L — SIGNIFICANT CHANGE UP (ref 30–115)
ALT FLD-CCNC: 19 U/L — SIGNIFICANT CHANGE UP (ref 0–41)
ANION GAP SERPL CALC-SCNC: 9 MMOL/L — SIGNIFICANT CHANGE UP (ref 7–14)
AST SERPL-CCNC: 38 U/L — SIGNIFICANT CHANGE UP (ref 0–41)
BASOPHILS # BLD AUTO: 0.05 K/UL — SIGNIFICANT CHANGE UP (ref 0–0.2)
BASOPHILS NFR BLD AUTO: 0.9 % — SIGNIFICANT CHANGE UP (ref 0–1)
BILIRUB SERPL-MCNC: 0.2 MG/DL — SIGNIFICANT CHANGE UP (ref 0.2–1.2)
BUN SERPL-MCNC: 22 MG/DL — HIGH (ref 10–20)
CALCIUM SERPL-MCNC: 8.1 MG/DL — LOW (ref 8.5–10.1)
CHLORIDE SERPL-SCNC: 111 MMOL/L — HIGH (ref 98–110)
CO2 SERPL-SCNC: 22 MMOL/L — SIGNIFICANT CHANGE UP (ref 17–32)
CREAT SERPL-MCNC: 1.1 MG/DL — SIGNIFICANT CHANGE UP (ref 0.7–1.5)
CULTURE RESULTS: SIGNIFICANT CHANGE UP
EOSINOPHIL # BLD AUTO: 0.22 K/UL — SIGNIFICANT CHANGE UP (ref 0–0.7)
EOSINOPHIL NFR BLD AUTO: 4.2 % — SIGNIFICANT CHANGE UP (ref 0–8)
GLUCOSE SERPL-MCNC: 112 MG/DL — HIGH (ref 70–99)
HCT VFR BLD CALC: 36.2 % — LOW (ref 37–47)
HGB BLD-MCNC: 11.1 G/DL — LOW (ref 12–16)
IMM GRANULOCYTES NFR BLD AUTO: 0.4 % — HIGH (ref 0.1–0.3)
LYMPHOCYTES # BLD AUTO: 0.92 K/UL — LOW (ref 1.2–3.4)
LYMPHOCYTES # BLD AUTO: 17.4 % — LOW (ref 20.5–51.1)
MAGNESIUM SERPL-MCNC: 2 MG/DL — SIGNIFICANT CHANGE UP (ref 1.8–2.4)
MCHC RBC-ENTMCNC: 29.7 PG — SIGNIFICANT CHANGE UP (ref 27–31)
MCHC RBC-ENTMCNC: 30.7 G/DL — LOW (ref 32–37)
MCV RBC AUTO: 96.8 FL — SIGNIFICANT CHANGE UP (ref 81–99)
MONOCYTES # BLD AUTO: 0.38 K/UL — SIGNIFICANT CHANGE UP (ref 0.1–0.6)
MONOCYTES NFR BLD AUTO: 7.2 % — SIGNIFICANT CHANGE UP (ref 1.7–9.3)
NEUTROPHILS # BLD AUTO: 3.69 K/UL — SIGNIFICANT CHANGE UP (ref 1.4–6.5)
NEUTROPHILS NFR BLD AUTO: 69.9 % — SIGNIFICANT CHANGE UP (ref 42.2–75.2)
NRBC # BLD: 0 /100 WBCS — SIGNIFICANT CHANGE UP (ref 0–0)
PLATELET # BLD AUTO: 213 K/UL — SIGNIFICANT CHANGE UP (ref 130–400)
POTASSIUM SERPL-MCNC: 4 MMOL/L — SIGNIFICANT CHANGE UP (ref 3.5–5)
POTASSIUM SERPL-SCNC: 4 MMOL/L — SIGNIFICANT CHANGE UP (ref 3.5–5)
PROT SERPL-MCNC: 5 G/DL — LOW (ref 6–8)
RBC # BLD: 3.74 M/UL — LOW (ref 4.2–5.4)
RBC # FLD: 14.3 % — SIGNIFICANT CHANGE UP (ref 11.5–14.5)
SODIUM SERPL-SCNC: 142 MMOL/L — SIGNIFICANT CHANGE UP (ref 135–146)
SPECIMEN SOURCE: SIGNIFICANT CHANGE UP
WBC # BLD: 5.28 K/UL — SIGNIFICANT CHANGE UP (ref 4.8–10.8)
WBC # FLD AUTO: 5.28 K/UL — SIGNIFICANT CHANGE UP (ref 4.8–10.8)

## 2020-11-28 PROCEDURE — 99233 SBSQ HOSP IP/OBS HIGH 50: CPT

## 2020-11-28 RX ORDER — HEPARIN SODIUM 5000 [USP'U]/ML
5000 INJECTION INTRAVENOUS; SUBCUTANEOUS EVERY 12 HOURS
Refills: 0 | Status: DISCONTINUED | OUTPATIENT
Start: 2020-11-28 | End: 2020-11-29

## 2020-11-28 RX ORDER — ENOXAPARIN SODIUM 100 MG/ML
40 INJECTION SUBCUTANEOUS DAILY
Refills: 0 | Status: DISCONTINUED | OUTPATIENT
Start: 2020-11-28 | End: 2020-11-28

## 2020-11-28 RX ADMIN — CEFTRIAXONE 100 MILLIGRAM(S): 500 INJECTION, POWDER, FOR SOLUTION INTRAMUSCULAR; INTRAVENOUS at 21:12

## 2020-11-28 RX ADMIN — NYSTATIN CREAM 1 APPLICATION(S): 100000 CREAM TOPICAL at 17:12

## 2020-11-28 RX ADMIN — RISPERIDONE 0.5 MILLIGRAM(S): 4 TABLET ORAL at 06:00

## 2020-11-28 RX ADMIN — Medication 125 MICROGRAM(S): at 06:00

## 2020-11-28 RX ADMIN — RISPERIDONE 0.5 MILLIGRAM(S): 4 TABLET ORAL at 17:10

## 2020-11-28 RX ADMIN — HEPARIN SODIUM 5000 UNIT(S): 5000 INJECTION INTRAVENOUS; SUBCUTANEOUS at 17:09

## 2020-11-28 RX ADMIN — NYSTATIN CREAM 1 APPLICATION(S): 100000 CREAM TOPICAL at 05:58

## 2020-11-28 NOTE — PROGRESS NOTE ADULT - ATTENDING COMMENTS
Mrs. Booker is a 87 y/o female with a PMHx of HTN, COPD, blepharospasm, vitamin d deficiency, hypothyroidism admitted for evaluation of frequent falls and found to have a UTI on admission.    #Frequent falls most likely multifactorial mechanical/medication induced vs orthostatics vs deconditioning  trauma work up negative    f/u orthostatic vitals  c/w NS 75ml/hr  PT eval - STR - however family not wanting patient to go would prefer home w/ home PT  fall precautions    #Osteoarthritis of Hip and lumbar spine  Xray hip: Degenerative changes to the bilateral hip joints, pubic symphysis and visible portions of the lumbar spine  pain control  Physical therapy     #Acute cystitis  UA positive: many bacteria, wbc of 358, positive nitrites, and large leukocyte esterase although dirty catch   has frequency, denies burning  c/w Rocephin 1000 mg qd   UCx - negative      #Acute Kidney injury, possibly prerenal from dehyration vs CKD  Cr 1.8 on admission, baseline creatinine 1.5   c/w NS  75ml/hr    #Hypertension (controlled)  /56 today, not on any meds  Triamterene-HCTZ 25/37.5mg was discontinued on last admission   Monitor BP     #Hypothyroidism  c/w synthroid 125mcg  f/u tsh, t4, t3 levels    #Dementia, possibly with Lewy bodies vs Delirium   Per daughter patient has visual hallucination and memory loss in the past year  Has seen outpatient Neurologists Dr. Agusto Gordon   c/w risperidone 0.5 mg twice a day    #History of b/l blepharospasm  refractory to Botox injections x 3  patient wears sunglasses due to discomfort     #Vit D deficiency  c/w vit D supplements    #COPD - stable  no wheezing  currently not on any copd medications - monitor    I saw and evaluated the patient on the above date of service.    I agree with the above history, physical, and plan which I have reviewed and edited where appropriate.

## 2020-11-28 NOTE — PROGRESS NOTE ADULT - SUBJECTIVE AND OBJECTIVE BOX
CATALINA COOK 88y Female  MRN#: [id number]   CODE STATUS: DNR/DNI      SUBJECTIVE  Patient is a 88y old Female who presents with a chief complaint of Left Hip Pain (2020 11:30)  Currently admitted to medicine with the primary diagnosis of Hip pain, acute, left    Today is hospital day 2d, and this morning she is resting comfortably in bed. She reports pain in her left leg and inability to move it well. Distal pulses are intact and there is no redness or swelling of her left leg. Patient denies dysuria but endorses polyuria.   No overnight events.         OBJECTIVE  PAST MEDICAL & SURGICAL HISTORY  Blepharospasm    Vitamin D deficiency    Hypothyroid    COPD (chronic obstructive pulmonary disease)    HTN (hypertension)    History of skin cancer    H/O total hysterectomy    History of cholecystectomy      ALLERGIES:  No Known Allergies    MEDICATIONS:  STANDING MEDICATIONS  cefTRIAXone   IVPB 1000 milliGRAM(s) IV Intermittent every 24 hours  chlorhexidine 4% Liquid 1 Application(s) Topical daily  influenza   Vaccine 0.5 milliLiter(s) IntraMuscular once  levothyroxine 125 MICROGram(s) Oral daily  nystatin Powder 1 Application(s) Topical two times a day  risperiDONE   Tablet 0.5 milliGRAM(s) Oral two times a day  sodium chloride 0.9%. 1000 milliLiter(s) IV Continuous <Continuous>    PRN MEDICATIONS      VITAL SIGNS: Last 24 Hours  T(C): 36.6 (2020 05:48), Max: 36.6 (2020 05:48)  T(F): 97.8 (2020 05:48), Max: 97.8 (2020 05:48)  HR: 77 (2020 05:48) (77 - 86)  BP: 152/69 (2020 05:48) (118/58 - 152/69)  BP(mean): 92 (2020 08:07) (92 - 92)  RR: 18 (2020 05:48) (18 - 18)  SpO2: 95% (2020 08:07) (95% - 95%)    LABS:                        11.1   5.90  )-----------( 227      ( 2020 05:47 )             35.5     11-    142  |  110  |  31<H>  ----------------------------<  87  4.0   |  21  |  1.4    Ca    8.3<L>      2020 05:47  Mg     2.2         TPro  6.4  /  Alb  4.1  /  TBili  0.3  /  DBili  x   /  AST  56<H>  /  ALT  23  /  AlkPhos  70        Urinalysis Basic - ( 2020 22:03 )    Color: Yellow / Appearance: Slightly Turbid / S.028 / pH: x  Gluc: x / Ketone: Negative  / Bili: Negative / Urobili: <2 mg/dL   Blood: x / Protein: 30 mg/dL / Nitrite: Positive   Leuk Esterase: Large / RBC: 21 /HPF /  /HPF   Sq Epi: x / Non Sq Epi: 14 /HPF / Bacteria: Many        CARDIAC MARKERS ( 2020 19:50 )  x     / <0.01 ng/mL / x     / x     / x            PHYSICAL EXAM:  GENERAL: NAD, well-developed,  HEENT:  Atraumatic, Normocephalic. EOMI, conjunctiva and sclera clear, No JVD  PULMONARY: CTA bilaterally, no wheeze   CARDIOVASCULAR: Regular rate and rhythm; No murmurs, rubs, or gallops  GASTROINTESTINAL: Soft, Nontender, Nondistended; Bowel sounds present  MUSCULOSKELETAL: No clubbing, cyanosis, or edema  NEUROLOGY: non-focal  SKIN: No rashes or lesions      ASSESSMENT & PLAN  Mrs. Cook is a 89 y/o female with a PMHx of HTN, COPD, blepharospasm, vitamin d deficiency, hypothyroidism admitted for evaluation of frequent falls and found to have a UTI on admission.    #Frequent falls most likely mechanical falls/age related vs medication induced vs orthostatic hypotension  - In ED trauma work up negative  (CT head, CT neck/cervical spine, Xray hip/knee/pelvis)  - follow up orthostatic vitals  - continue gentle hydration - NS 75ml/hr  - PT - rolling walker, recommend rehab upon discharge   - fall precautions    #Acute cystitis  - UA positive: many bacteria, wbc of 358, positive nitrites, and large leukocyte esterase.  - Pt endorses frequency, denies burning  - Started on Rocephin 1000 mg qd  - follow up urine culture results  - Bcx sent     #Acute Kidney injury, possibly prerenal from dehyration vs CKD  - Cr 1.8 on admission, baseline creatinine 1.5   - continue with gentle hydration NS  75ml/hr    # Hypertension (controlled)  - /56 today, not on any meds  - Triamterene-HCTZ 25/37.5mg was discontinued on last admission   - continue to monitor     #Hypothyroidism  -C/w synthroid 125mcg  - f/u tsh, t4, t3 levels    #Dementia, possibly with Lewy bodies vs Delirium   - Per daughter patient has visual hallucination and memory loss in the past year  - Has seen outpatient Neurologists Dr. Agusto Gordon   - continue with risperidone 0.5 mg twice a day    #History of b/l blepharospasm  -refractory to Botox injections x 3  -patient wears sunglasses due to discomfort     #Vit D deficiency  -C/w vit D supplements    #COPD - stable  - No wheezing on physical exam; not in exacerbation  - currently not on any copd medications    Diet:DASH  Activity: bed rest  DVT Prophylaxis: SCD  Code Status: DNR/DNI  Disposition: from home; would benefit from home services/rehab

## 2020-11-29 VITALS
DIASTOLIC BLOOD PRESSURE: 60 MMHG | RESPIRATION RATE: 18 BRPM | HEART RATE: 79 BPM | TEMPERATURE: 98 F | SYSTOLIC BLOOD PRESSURE: 125 MMHG

## 2020-11-29 PROCEDURE — 99239 HOSP IP/OBS DSCHRG MGMT >30: CPT

## 2020-11-29 PROCEDURE — 93970 EXTREMITY STUDY: CPT | Mod: 26

## 2020-11-29 RX ORDER — CEFTRIAXONE 500 MG/1
INJECTION, POWDER, FOR SOLUTION INTRAMUSCULAR; INTRAVENOUS
Refills: 0 | Status: DISCONTINUED | OUTPATIENT
Start: 2020-11-29 | End: 2020-11-29

## 2020-11-29 RX ORDER — LEVOTHYROXINE SODIUM 125 MCG
1 TABLET ORAL
Qty: 0 | Refills: 0 | DISCHARGE

## 2020-11-29 RX ORDER — LEVOTHYROXINE SODIUM 125 MCG
1 TABLET ORAL
Qty: 0 | Refills: 0 | DISCHARGE
Start: 2020-11-29

## 2020-11-29 RX ORDER — CEFTRIAXONE 500 MG/1
1000 INJECTION, POWDER, FOR SOLUTION INTRAMUSCULAR; INTRAVENOUS EVERY 24 HOURS
Refills: 0 | Status: DISCONTINUED | OUTPATIENT
Start: 2020-11-30 | End: 2020-11-29

## 2020-11-29 RX ORDER — RISPERIDONE 4 MG/1
1 TABLET ORAL
Qty: 0 | Refills: 0 | DISCHARGE
Start: 2020-11-29

## 2020-11-29 RX ORDER — CEFTRIAXONE 500 MG/1
1000 INJECTION, POWDER, FOR SOLUTION INTRAMUSCULAR; INTRAVENOUS ONCE
Refills: 0 | Status: COMPLETED | OUTPATIENT
Start: 2020-11-29 | End: 2020-11-29

## 2020-11-29 RX ADMIN — RISPERIDONE 0.5 MILLIGRAM(S): 4 TABLET ORAL at 05:40

## 2020-11-29 RX ADMIN — HEPARIN SODIUM 5000 UNIT(S): 5000 INJECTION INTRAVENOUS; SUBCUTANEOUS at 05:41

## 2020-11-29 RX ADMIN — Medication 125 MICROGRAM(S): at 05:40

## 2020-11-29 RX ADMIN — NYSTATIN CREAM 1 APPLICATION(S): 100000 CREAM TOPICAL at 05:41

## 2020-11-29 RX ADMIN — CEFTRIAXONE 100 MILLIGRAM(S): 500 INJECTION, POWDER, FOR SOLUTION INTRAMUSCULAR; INTRAVENOUS at 11:25

## 2020-11-29 NOTE — DISCHARGE NOTE NURSING/CASE MANAGEMENT/SOCIAL WORK - NSDCPETBCESMAN_GEN_ALL_CORE
If you are a smoker, it is important for your health to stop smoking. Please be aware that second hand smoke is also harmful.
- - -

## 2020-11-29 NOTE — DISCHARGE NOTE NURSING/CASE MANAGEMENT/SOCIAL WORK - PATIENT PORTAL LINK FT
You can access the FollowMyHealth Patient Portal offered by [facility] by registering at the following website: [email or web address]. By joining [facility]’s FollowMyHealth portal, you will also be able to view your health information using other applications (apps) compatible with our system.

## 2020-11-29 NOTE — DISCHARGE NOTE PROVIDER - NSDCCPCAREPLAN_GEN_ALL_CORE_FT
PRINCIPAL DISCHARGE DIAGNOSIS  Diagnosis: Hip pain, acute, left  Assessment and Plan of Treatment: OP PT  FUP PMD      SECONDARY DISCHARGE DIAGNOSES  Diagnosis: Frequent falls  Assessment and Plan of Treatment: FUP PT    Diagnosis: UTI (urinary tract infection)  Assessment and Plan of Treatment: completed antibiotic course    Diagnosis: Knee pain, left  Assessment and Plan of Treatment:     Diagnosis: Weakness  Assessment and Plan of Treatment:      PRINCIPAL DISCHARGE DIAGNOSIS  Diagnosis: UTI (urinary tract infection)  Assessment and Plan of Treatment: completed antibiotic course      SECONDARY DISCHARGE DIAGNOSES  Diagnosis: Frequent falls  Assessment and Plan of Treatment: maintain fall precautions  follow up with PT

## 2020-11-29 NOTE — DISCHARGE NOTE PROVIDER - NSDCMRMEDTOKEN_GEN_ALL_CORE_FT
levothyroxine 125 mcg (0.125 mg) oral tablet: 1 tab(s) orally once a day  risperiDONE 0.5 mg oral tablet: 1 tab(s) orally 2 times a day  Vitamin D3 1000 intl units oral tablet: 1 tab(s) orally once a day

## 2020-11-29 NOTE — DISCHARGE NOTE PROVIDER - HOSPITAL COURSE
Pt was admitted for altered mental status. She was found to have a UTI and treated with IV antibiotics. Mental status returned to baseline. PT recommended pt get STR but family declined and wanted to take her home with OP PT services. Pt was discharged home with H/C services. Pt was admitted for altered mental status. She was found to have a UTI and treated with IV antibiotics. Mental status returned to baseline. PT recommended pt get STR but family declined and wanted to take her home with OP PT services. Pt was discharged home with H/C services and a walker   Patient seen and examined this morning.  Lying comfortably in bed  In NAD  s/p LE doppler today - no DVT  medically stable for d/c today with home care - discussed with pt's daughter, Mónica in detail    Vital Signs Last 24 Hrs  T(C): 35.8 (29 Nov 2020 05:41), Max: 36.3 (28 Nov 2020 23:17)  T(F): 96.4 (29 Nov 2020 05:41), Max: 97.4 (28 Nov 2020 23:17)  HR: 72 (29 Nov 2020 05:41) (72 - 84)  BP: 143/70 (29 Nov 2020 05:41) (133/63 - 167/69)  BP(mean): --  RR: 18 (29 Nov 2020 05:41) (18 - 18)  SpO2: 98% (28 Nov 2020 23:17) (98% - 98%)    PHYSICAL EXAM:  GENERAL: NAD, well-groomed, well-developed  HEAD:  Atraumatic, Normocephalic  EYES: EOMI, PERRLA, conjunctiva and sclera clear  NERVOUS SYSTEM:  Awake, oriented to person and place but not time. Moves all extremities   CHEST/LUNG: Clear to percussion bilaterally; No rales, rhonchi, wheezing, or rubs  HEART: Regular rate and rhythm; No murmurs, rubs, or gallops  ABDOMEN: Soft, Nontender, Nondistended; Bowel sounds present, obese  EXTREMITIES:  2+ Peripheral Pulses, No clubbing, cyanosis, or edema  SKIN: No rashes or lesions    d/c home today  d/c planning took over 45 min  d/c papers done by me

## 2020-12-02 DIAGNOSIS — E03.9 HYPOTHYROIDISM, UNSPECIFIED: ICD-10-CM

## 2020-12-02 DIAGNOSIS — G31.83 NEUROCOGNITIVE DISORDER WITH LEWY BODIES: ICD-10-CM

## 2020-12-02 DIAGNOSIS — N30.00 ACUTE CYSTITIS WITHOUT HEMATURIA: ICD-10-CM

## 2020-12-02 DIAGNOSIS — R53.1 WEAKNESS: ICD-10-CM

## 2020-12-02 DIAGNOSIS — G24.5 BLEPHAROSPASM: ICD-10-CM

## 2020-12-02 DIAGNOSIS — I10 ESSENTIAL (PRIMARY) HYPERTENSION: ICD-10-CM

## 2020-12-02 DIAGNOSIS — R41.0 DISORIENTATION, UNSPECIFIED: ICD-10-CM

## 2020-12-02 DIAGNOSIS — M16.0 BILATERAL PRIMARY OSTEOARTHRITIS OF HIP: ICD-10-CM

## 2020-12-02 DIAGNOSIS — M25.562 PAIN IN LEFT KNEE: ICD-10-CM

## 2020-12-02 DIAGNOSIS — F02.80 DEMENTIA IN OTHER DISEASES CLASSIFIED ELSEWHERE, UNSPECIFIED SEVERITY, WITHOUT BEHAVIORAL DISTURBANCE, PSYCHOTIC DISTURBANCE, MOOD DISTURBANCE, AND ANXIETY: ICD-10-CM

## 2020-12-02 DIAGNOSIS — R29.6 REPEATED FALLS: ICD-10-CM

## 2020-12-02 DIAGNOSIS — J44.9 CHRONIC OBSTRUCTIVE PULMONARY DISEASE, UNSPECIFIED: ICD-10-CM

## 2020-12-02 DIAGNOSIS — Z87.891 PERSONAL HISTORY OF NICOTINE DEPENDENCE: ICD-10-CM

## 2020-12-02 DIAGNOSIS — E55.9 VITAMIN D DEFICIENCY, UNSPECIFIED: ICD-10-CM

## 2020-12-02 DIAGNOSIS — E86.0 DEHYDRATION: ICD-10-CM

## 2020-12-02 DIAGNOSIS — Z85.828 PERSONAL HISTORY OF OTHER MALIGNANT NEOPLASM OF SKIN: ICD-10-CM

## 2020-12-02 DIAGNOSIS — M47.816 SPONDYLOSIS WITHOUT MYELOPATHY OR RADICULOPATHY, LUMBAR REGION: ICD-10-CM

## 2020-12-02 DIAGNOSIS — Z66 DO NOT RESUSCITATE: ICD-10-CM

## 2020-12-02 DIAGNOSIS — N17.9 ACUTE KIDNEY FAILURE, UNSPECIFIED: ICD-10-CM

## 2020-12-02 LAB
CULTURE RESULTS: SIGNIFICANT CHANGE UP
SPECIMEN SOURCE: SIGNIFICANT CHANGE UP

## 2021-08-22 ENCOUNTER — INPATIENT (INPATIENT)
Facility: HOSPITAL | Age: 86
LOS: 4 days | Discharge: NURSING, REHAB OR LONG-TERM CARE | End: 2021-08-27
Attending: INTERNAL MEDICINE | Admitting: INTERNAL MEDICINE
Payer: MEDICARE

## 2021-08-22 VITALS
DIASTOLIC BLOOD PRESSURE: 65 MMHG | RESPIRATION RATE: 18 BRPM | HEIGHT: 62 IN | HEART RATE: 66 BPM | SYSTOLIC BLOOD PRESSURE: 120 MMHG | OXYGEN SATURATION: 98 % | WEIGHT: 220.02 LBS | TEMPERATURE: 88 F

## 2021-08-22 DIAGNOSIS — Z90.710 ACQUIRED ABSENCE OF BOTH CERVIX AND UTERUS: Chronic | ICD-10-CM

## 2021-08-22 DIAGNOSIS — Z85.828 PERSONAL HISTORY OF OTHER MALIGNANT NEOPLASM OF SKIN: Chronic | ICD-10-CM

## 2021-08-22 DIAGNOSIS — Z90.49 ACQUIRED ABSENCE OF OTHER SPECIFIED PARTS OF DIGESTIVE TRACT: Chronic | ICD-10-CM

## 2021-08-22 LAB
ALBUMIN SERPL ELPH-MCNC: 3.9 G/DL — SIGNIFICANT CHANGE UP (ref 3.5–5.2)
ALP SERPL-CCNC: 124 U/L — HIGH (ref 30–115)
ALT FLD-CCNC: 19 U/L — SIGNIFICANT CHANGE UP (ref 0–41)
ANION GAP SERPL CALC-SCNC: 9 MMOL/L — SIGNIFICANT CHANGE UP (ref 7–14)
APPEARANCE UR: ABNORMAL
APTT BLD: 47.1 SEC — HIGH (ref 27–39.2)
AST SERPL-CCNC: 20 U/L — SIGNIFICANT CHANGE UP (ref 0–41)
BACTERIA # UR AUTO: ABNORMAL
BASE EXCESS BLDV CALC-SCNC: -0.7 MMOL/L — SIGNIFICANT CHANGE UP (ref -2–2)
BASOPHILS # BLD AUTO: 0.02 K/UL — SIGNIFICANT CHANGE UP (ref 0–0.2)
BASOPHILS NFR BLD AUTO: 0.4 % — SIGNIFICANT CHANGE UP (ref 0–1)
BILIRUB SERPL-MCNC: 0.2 MG/DL — SIGNIFICANT CHANGE UP (ref 0.2–1.2)
BILIRUB UR-MCNC: NEGATIVE — SIGNIFICANT CHANGE UP
BUN SERPL-MCNC: 34 MG/DL — HIGH (ref 10–20)
CA-I SERPL-SCNC: 1.25 MMOL/L — SIGNIFICANT CHANGE UP (ref 1.12–1.3)
CALCIUM SERPL-MCNC: 9.5 MG/DL — SIGNIFICANT CHANGE UP (ref 8.5–10.1)
CHLORIDE SERPL-SCNC: 107 MMOL/L — SIGNIFICANT CHANGE UP (ref 98–110)
CK SERPL-CCNC: 178 U/L — SIGNIFICANT CHANGE UP (ref 0–225)
CO2 SERPL-SCNC: 26 MMOL/L — SIGNIFICANT CHANGE UP (ref 17–32)
COLOR SPEC: YELLOW — SIGNIFICANT CHANGE UP
CREAT SERPL-MCNC: 1.1 MG/DL — SIGNIFICANT CHANGE UP (ref 0.7–1.5)
DIFF PNL FLD: ABNORMAL
EOSINOPHIL # BLD AUTO: 0.06 K/UL — SIGNIFICANT CHANGE UP (ref 0–0.7)
EOSINOPHIL NFR BLD AUTO: 1.2 % — SIGNIFICANT CHANGE UP (ref 0–8)
EPI CELLS # UR: ABNORMAL /HPF
GAS PNL BLDV: 144 MMOL/L — SIGNIFICANT CHANGE UP (ref 136–145)
GAS PNL BLDV: SIGNIFICANT CHANGE UP
GLUCOSE SERPL-MCNC: 109 MG/DL — HIGH (ref 70–99)
GLUCOSE UR QL: NEGATIVE MG/DL — SIGNIFICANT CHANGE UP
HCO3 BLDV-SCNC: 27 MMOL/L — SIGNIFICANT CHANGE UP (ref 22–29)
HCT VFR BLD CALC: 37 % — SIGNIFICANT CHANGE UP (ref 37–47)
HCT VFR BLDA CALC: 43.6 % — SIGNIFICANT CHANGE UP (ref 34–44)
HGB BLD CALC-MCNC: 14.2 G/DL — SIGNIFICANT CHANGE UP (ref 14–18)
HGB BLD-MCNC: 11.8 G/DL — LOW (ref 12–16)
HOROWITZ INDEX BLDV+IHG-RTO: 21 — SIGNIFICANT CHANGE UP
IMM GRANULOCYTES NFR BLD AUTO: 0.2 % — SIGNIFICANT CHANGE UP (ref 0.1–0.3)
INR BLD: 1.07 RATIO — SIGNIFICANT CHANGE UP (ref 0.65–1.3)
KETONES UR-MCNC: NEGATIVE — SIGNIFICANT CHANGE UP
LACTATE BLDV-MCNC: 1 MMOL/L — SIGNIFICANT CHANGE UP (ref 0.5–1.6)
LACTATE SERPL-SCNC: 1 MMOL/L — SIGNIFICANT CHANGE UP (ref 0.7–2)
LEUKOCYTE ESTERASE UR-ACNC: ABNORMAL
LYMPHOCYTES # BLD AUTO: 0.52 K/UL — LOW (ref 1.2–3.4)
LYMPHOCYTES # BLD AUTO: 10.2 % — LOW (ref 20.5–51.1)
MAGNESIUM SERPL-MCNC: 2.2 MG/DL — SIGNIFICANT CHANGE UP (ref 1.8–2.4)
MCHC RBC-ENTMCNC: 29.6 PG — SIGNIFICANT CHANGE UP (ref 27–31)
MCHC RBC-ENTMCNC: 31.9 G/DL — LOW (ref 32–37)
MCV RBC AUTO: 93 FL — SIGNIFICANT CHANGE UP (ref 81–99)
MONOCYTES # BLD AUTO: 0.36 K/UL — SIGNIFICANT CHANGE UP (ref 0.1–0.6)
MONOCYTES NFR BLD AUTO: 7 % — SIGNIFICANT CHANGE UP (ref 1.7–9.3)
NEUTROPHILS # BLD AUTO: 4.14 K/UL — SIGNIFICANT CHANGE UP (ref 1.4–6.5)
NEUTROPHILS NFR BLD AUTO: 81 % — HIGH (ref 42.2–75.2)
NITRITE UR-MCNC: POSITIVE
NRBC # BLD: 0 /100 WBCS — SIGNIFICANT CHANGE UP (ref 0–0)
PCO2 BLDV: 59 MMHG — HIGH (ref 41–51)
PH BLDV: 7.27 — SIGNIFICANT CHANGE UP (ref 7.26–7.43)
PH UR: 6 — SIGNIFICANT CHANGE UP (ref 5–8)
PLATELET # BLD AUTO: 169 K/UL — SIGNIFICANT CHANGE UP (ref 130–400)
PO2 BLDV: 34 MMHG — SIGNIFICANT CHANGE UP (ref 20–40)
POTASSIUM BLDV-SCNC: 4.2 MMOL/L — SIGNIFICANT CHANGE UP (ref 3.3–5.6)
POTASSIUM SERPL-MCNC: 4.4 MMOL/L — SIGNIFICANT CHANGE UP (ref 3.5–5)
POTASSIUM SERPL-SCNC: 4.4 MMOL/L — SIGNIFICANT CHANGE UP (ref 3.5–5)
PROT SERPL-MCNC: 6.2 G/DL — SIGNIFICANT CHANGE UP (ref 6–8)
PROT UR-MCNC: >=300 MG/DL
PROTHROM AB SERPL-ACNC: 12.3 SEC — SIGNIFICANT CHANGE UP (ref 9.95–12.87)
RBC # BLD: 3.98 M/UL — LOW (ref 4.2–5.4)
RBC # FLD: 14.1 % — SIGNIFICANT CHANGE UP (ref 11.5–14.5)
RBC CASTS # UR COMP ASSIST: ABNORMAL /HPF
SAO2 % BLDV: 56 % — SIGNIFICANT CHANGE UP
SODIUM SERPL-SCNC: 142 MMOL/L — SIGNIFICANT CHANGE UP (ref 135–146)
SP GR SPEC: 1.02 — SIGNIFICANT CHANGE UP (ref 1.01–1.03)
TROPONIN T SERPL-MCNC: <0.01 NG/ML — SIGNIFICANT CHANGE UP
UROBILINOGEN FLD QL: 0.2 MG/DL — SIGNIFICANT CHANGE UP (ref 0.2–0.2)
WBC # BLD: 5.11 K/UL — SIGNIFICANT CHANGE UP (ref 4.8–10.8)
WBC # FLD AUTO: 5.11 K/UL — SIGNIFICANT CHANGE UP (ref 4.8–10.8)
WBC UR QL: >50 /HPF

## 2021-08-22 PROCEDURE — 70450 CT HEAD/BRAIN W/O DYE: CPT | Mod: 26,MA

## 2021-08-22 PROCEDURE — 99285 EMERGENCY DEPT VISIT HI MDM: CPT

## 2021-08-22 PROCEDURE — 73590 X-RAY EXAM OF LOWER LEG: CPT | Mod: 26,RT

## 2021-08-22 PROCEDURE — 93010 ELECTROCARDIOGRAM REPORT: CPT

## 2021-08-22 PROCEDURE — 72170 X-RAY EXAM OF PELVIS: CPT | Mod: 26

## 2021-08-22 PROCEDURE — 71045 X-RAY EXAM CHEST 1 VIEW: CPT | Mod: 26

## 2021-08-22 PROCEDURE — 73552 X-RAY EXAM OF FEMUR 2/>: CPT | Mod: 26,RT

## 2021-08-22 RX ORDER — HYDROCORTISONE 20 MG
100 TABLET ORAL ONCE
Refills: 0 | Status: COMPLETED | OUTPATIENT
Start: 2021-08-22 | End: 2021-08-22

## 2021-08-22 RX ORDER — SODIUM CHLORIDE 9 MG/ML
1000 INJECTION, SOLUTION INTRAVENOUS ONCE
Refills: 0 | Status: COMPLETED | OUTPATIENT
Start: 2021-08-22 | End: 2021-08-22

## 2021-08-22 RX ORDER — CEFTRIAXONE 500 MG/1
2000 INJECTION, POWDER, FOR SOLUTION INTRAMUSCULAR; INTRAVENOUS ONCE
Refills: 0 | Status: COMPLETED | OUTPATIENT
Start: 2021-08-22 | End: 2021-08-22

## 2021-08-22 RX ORDER — LEVOTHYROXINE SODIUM 125 MCG
100 TABLET ORAL AT BEDTIME
Refills: 0 | Status: DISCONTINUED | OUTPATIENT
Start: 2021-08-22 | End: 2021-08-26

## 2021-08-22 RX ADMIN — Medication 100 MILLIGRAM(S): at 22:59

## 2021-08-22 RX ADMIN — Medication 100 MICROGRAM(S): at 22:59

## 2021-08-22 RX ADMIN — CEFTRIAXONE 100 MILLIGRAM(S): 500 INJECTION, POWDER, FOR SOLUTION INTRAMUSCULAR; INTRAVENOUS at 23:00

## 2021-08-22 RX ADMIN — SODIUM CHLORIDE 1000 MILLILITER(S): 9 INJECTION, SOLUTION INTRAVENOUS at 23:00

## 2021-08-22 NOTE — ED PROVIDER NOTE - OBJECTIVE STATEMENT
Patient BIBA from home, Family states worsening dementia past few day, weaker, trouble ambulating, Slipped out of chair onto floor, Unable to get patient up, States she was like this in the past with UTIs

## 2021-08-22 NOTE — ED PROVIDER NOTE - ATTENDING CONTRIBUTION TO CARE
I personally evaluated the patient. I reviewed the Resident’s or Physician Assistant’s note (as assigned above), and agree with the findings and plan except as documented in my note.     88 female here for weakness and delirium from home with limited ambulation and decrease in ADLs, similar to prior UTI history.     ROS limited due to existing baseline cognitive decline    PE: female in no distress. CV: pulses intact. CHEST: normal work of breathing. ABD: nondistended. SKIN: normal. EXT: FROM. NEURO: cognitively impaired no gross focal deficits     Impression: UTI    Plan: IV labs imaging supportive care and reevaluation

## 2021-08-22 NOTE — ED PROVIDER NOTE - CADM POA PRESS ULCER
Tarsorrhaphy Text: A tarsorrhaphy was performed using Frost sutures. Mid-Level Procedure Text (A): After obtaining clear surgical margins the patient was sent to a mid-level provider for surgical repair. The patient understands they will receive post-surgical care and follow-up from the mid-level provider. O-Z Plasty Text: The defect edges were debeveled with a #15 scalpel blade. Given the location of the defect, shape of the defect and the proximity to free margins an O-Z plasty (double transposition flap) was deemed most appropriate. Using a sterile surgical marker, the appropriate transposition flaps were drawn incorporating the defect and placing the expected incisions within the relaxed skin tension lines where possible. The area thus outlined was incised deep to adipose tissue with a #15 scalpel blade. The skin margins were undermined to an appropriate distance in all directions utilizing iris scissors. Hemostasis was achieved with electrocautery. The flaps were then transposed into place, one clockwise and the other counterclockwise, and anchored with interrupted buried subcutaneous sutures. Advancement Flap (Single) Text: After a lengthy discussion with the patient regarding the wound treatment reconstruction options available including but not limited to simple repair, intermediate repair, complex repair, adjacent tissue transfer, tissue graft as well as allowing the lesion to repair by secondary intention. It was determined that due to the defect location, complexity, and given indications; an adjacent tissue transfer (advancement flap) would be the most appropriate means for repair of the surgical defect as the defect extended through the skin into the subcutaneous tissues, and required rearrangement or transfer of adjacent tissue to repair the surgical wound. \\n\\n\\n\\n\\n\\nThe defect edges were debeveled with a #15 scalpel blade. Given the location of the defect and the proximity to free margins a single advancement flap was deemed most appropriate. Using a sterile surgical marker, an appropriate advancement flap was drawn incorporating the defect and placing the expected incisions within the relaxed skin tension lines where possible. The area thus outlined was incised deep to adipose tissue with a #15 scalpel blade. The skin margins were undermined to an appropriate distance in all directions utilizing iris scissors. Tissue Cultured Epidermal Autograft Text: The defect edges were debeveled with a #15 scalpel blade. Given the location of the defect, shape of the defect and the proximity to free margins a tissue cultured epidermal autograft was deemed most appropriate. The graft was then trimmed to fit the size of the defect. The graft was then placed in the primary defect and oriented appropriately. Asc Procedure Text (C): After obtaining clear surgical margins the patient was sent to an [facility] for surgical repair. The patient understands they will receive post-surgical care and follow-up from the [facility] physician. Plastic Surgeon Procedure Text (C): After obtaining clear surgical margins the patient was sent to plastics for surgical repair. The patient understands they will receive post-surgical care and follow-up from the referring physician's office. Show Surgical Defect Variables In The Stage Tabs: Yes Stage 14: Additional Anesthesia Type: 1% lidocaine with epinephrine Advancement-Rotation Flap Text: The defect edges were debeveled with a #15 scalpel blade. Given the location of the defect, shape of the defect and the proximity to free margins an advancement-rotation flap was deemed most appropriate. Using a sterile surgical marker, an appropriate flap was drawn incorporating the defect and placing the expected incisions within the relaxed skin tension lines where possible. The area thus outlined was incised deep to adipose tissue with a #15 scalpel blade. The skin margins were undermined to an appropriate distance in all directions utilizing iris scissors. Mixed Nodular And Micronodular Bcc Histology Text: Beneath an irregular epidermis, there are varying sizes of nodular masses of basaloid neoplastic cells with nuclear pleomorphism attached to the basal layer and surrounded by a loose fibrous stroma and mild inflammation in the dermis. The findings are typical for a basal cell carcinoma. Repair Type: Flap Modified Advancement Flap Text: The defect edges were debeveled with a #15 scalpel blade. Given the location of the defect, shape of the defect and the proximity to free margins a modified advancement flap was deemed most appropriate. Using a sterile surgical marker, an appropriate advancement flap was drawn incorporating the defect and placing the expected incisions within the relaxed skin tension lines where possible. The area thus outlined was incised deep to adipose tissue with a #15 scalpel blade. The skin margins were undermined to an appropriate distance in all directions utilizing iris scissors. Mid-Level Procedure Text (B): After obtaining clear surgical margins the patient was sent to a mid-level provider for surgical repair.  The patient understands they will receive post-surgical care and follow-up from the mid-level provider. Quadrants Reporting?: 0 Scc Well Differentiated Histology Text: There are nests of squamous epithelial cells arising from the epidermis and extending into the dermis. The malignant cells are large with abundant eosinophilic cytoplasm and a large vesicular nucleus. Graft Donor Site Dermal Sutures (Optional): 5-0 Monocryl Spiral Flap Text: The defect edges were debeveled with a #15 scalpel blade. Given the location of the defect, shape of the defect and the proximity to free margins a spiral flap was deemed most appropriate. Using a sterile surgical marker, an appropriate rotation flap was drawn incorporating the defect and placing the expected incisions within the relaxed skin tension lines where possible. The area thus outlined was incised deep to adipose tissue with a #15 scalpel blade. The skin margins were undermined to an appropriate distance in all directions utilizing iris scissors. Rhombic Flap Text: The defect edges were debeveled with a #15 scalpel blade. Given the location of the defect and the proximity to free margins a rhombic flap was deemed most appropriate. Using a sterile surgical marker, an appropriate rhombic flap was drawn incorporating the defect. The area thus outlined was incised deep to adipose tissue with a #15 scalpel blade. The skin margins were undermined to an appropriate distance in all directions utilizing iris scissors. Consent 2/Introductory Paragraph: Mohs surgery was explained to the patient and consent was obtained. The risks, benefits and alternatives to therapy were discussed in detail. Specifically, the risks of infection, scarring, bleeding, prolonged wound healing, incomplete removal, allergy to anesthesia, nerve injury and recurrence were addressed. Prior to the procedure, the treatment site was clearly identified and confirmed by the patient. All components of Universal Protocol/PAUSE Rule completed. Bilobed Transposition Flap Text: The defect edges were debeveled with a #15 scalpel blade. Given the location of the defect and the proximity to free margins a bilobed transposition flap was deemed most appropriate. Using a sterile surgical marker, an appropriate bilobe flap drawn around the defect. The area thus outlined was incised deep to adipose tissue with a #15 scalpel blade. The skin margins were undermined to an appropriate distance in all directions utilizing iris scissors. Eye Protection Verbiage: Before proceeding with the stage, a plastic scleral shield was inserted. The globe was anesthetized with a few drops of 1% lidocaine with 1:100,000 epinephrine. Then, an appropriate sized scleral shield was chosen and coated with lacrilube ointment. The shield was gently inserted and left in place for the duration of each stage. After the stage was completed, the shield was gently removed. Initial Size Of Lesion: 0.4 Mixed Superficial And Nodular Bcc Histology Text: On the basal layer of and beneath an irregular epidermis, there are small nodular masses of basaloid neoplastic cells with nuclear pleomorphism attached to the basal layer and surrounded by a loose fibrous stroma and mild inflammation in the dermis. The findings are typical for a basal cell carcinoma. Consent (Lip)/Introductory Paragraph: The rationale for Mohs was explained to the patient and consent was obtained. The risks, benefits and alternatives to therapy were discussed in detail. Specifically, the risks of lip deformity, changes in the oral aperture, infection, scarring, bleeding, prolonged wound healing, incomplete removal, allergy to anesthesia, nerve injury and recurrence were addressed. Prior to the procedure, the treatment site was clearly identified and confirmed by the patient. All components of Universal Protocol/PAUSE Rule completed. Medical Necessity Statement: Based on my medical judgement; after reviewing the pertinent pathology report, physical exam findings and the various treatment options for skin cancer treatment; standard excision and/or destruction technique methods are not clinically sufficient treatments options. To prevent the risk of compromising surgical cure and reconstruction; prompt microscopic examination of the surgical margins is necessary. Based on the given indication(s), maximum conservation of healthy tissue, and high cure rate, Mohs surgery is the most appropriate treatment for this cancer. Area L Indication Text: Tumors in this location are included in Area L (trunk and extremities). Mohs surgery is indicated for larger tumors, or tumors with aggressive histologic features, in these anatomic locations. Graft Donor Site Will Heal By Secondary Intention: No Hemostasis: Electrocautery Composite Graft Text: The defect edges were debeveled with a #15 scalpel blade. Given the location of the defect, shape of the defect, the proximity to free margins and the fact the defect was full thickness a composite graft was deemed most appropriate. The defect was outline and then transferred to the donor site. A full thickness graft was then excised from the donor site. The graft was then placed in the primary defect, oriented appropriately and then sutured into place. The secondary defect was then repaired using a primary closure. O-T Plasty Text: The defect edges were debeveled with a #15 scalpel blade. Given the location of the defect, shape of the defect and the proximity to free margins an O-T plasty was deemed most appropriate. Using a sterile surgical marker, an appropriate O-T plasty was drawn incorporating the defect and placing the expected incisions within the relaxed skin tension lines where possible. The area thus outlined was incised deep to adipose tissue with a #15 scalpel blade. The skin margins were undermined to an appropriate distance in all directions utilizing iris scissors. Alar Island Pedicle Flap Text: The defect edges were debeveled with a #15 scalpel blade. Given the location of the defect, shape of the defect and the proximity to the alar rim an island pedicle advancement flap was deemed most appropriate. Using a sterile surgical marker, an appropriate advancement flap was drawn incorporating the defect, outlining the appropriate donor tissue and placing the expected incisions within the nasal ala running parallel to the alar rim. The area thus outlined was incised with a #15 scalpel blade. The skin margins were undermined minimally to an appropriate distance in all directions around the primary defect and laterally outward around the island pedicle utilizing iris scissors. There was minimal undermining beneath the pedicle flap. Purse String (Intermediate) Text: Given the location of the defect and the characteristics of the surrounding skin a pursestring intermediate closure was deemed most appropriate. Undermining was performed circumfirentially around the surgical defect. A purstring suture was then placed and tightened. Otolaryngologist Procedure Text (C): After obtaining clear surgical margins the patient was sent to otolaryngology for surgical repair. The patient understands they will receive post-surgical care and follow-up from the referring physician's office. Undermining Location (Optional): in the deep fat Oculoplastic Surgeon Procedure Text (B): After obtaining clear surgical margins the patient was sent to oculoplastics for surgical repair. The patient understands they will receive post-surgical care and follow-up from the referring physician's office. A-T Advancement Flap Text: The defect edges were debeveled with a #15 scalpel blade. Given the location of the defect, shape of the defect and the proximity to free margins an A-T advancement flap was deemed most appropriate. Using a sterile surgical marker, an appropriate advancement flap was drawn incorporating the defect and placing the expected incisions within the relaxed skin tension lines where possible. The area thus outlined was incised deep to adipose tissue with a #15 scalpel blade. The skin margins were undermined to an appropriate distance in all directions utilizing iris scissors. Suture Removal: 14 days Surgical Defect Width In Cm (Optional): 0.5 Posterior Auricular Interpolation Flap Text: A decision was made to reconstruct the defect utilizing an interpolation axial flap and a staged reconstruction. A telfa template was made of the defect. This telfa template was then used to outline the posterior auricular interpolation flap. The donor area for the pedicle flap was then injected with anesthesia. The flap was excised through the skin and subcutaneous tissue down to the layer of the underlying musculature. The pedicle flap was carefully excised within this deep plane to maintain its blood supply. The edges of the donor site were undermined. The donor site was closed in a primary fashion. The pedicle was then rotated into position and sutured. Once the tube was sutured into place, adequate blood supply was confirmed with blanching and refill. The pedicle was then wrapped with xeroform gauze and dressed appropriately with a telfa and gauze bandage to ensure continued blood supply and protect the attached pedicle. Wound Care: Vaseline Lazy S Complex Repair Preamble Text (Leave Blank If You Do Not Want): Extensive wide undermining was performed. Cheek Interpolation Flap Text: A decision was made to reconstruct the defect utilizing an interpolation axial flap and a staged reconstruction. A telfa template was made of the defect. This telfa template was then used to outline the Cheek Interpolation flap. The donor area for the pedicle flap was then injected with anesthesia. The flap was excised through the skin and subcutaneous tissue down to the layer of the underlying musculature. The interpolation flap was carefully excised within this deep plane to maintain its blood supply. The edges of the donor site were undermined. The donor site was closed in a primary fashion. The pedicle was then rotated into position and sutured. Once the tube was sutured into place, adequate blood supply was confirmed with blanching and refill. The pedicle was then wrapped with xeroform gauze and dressed appropriately with a telfa and gauze bandage to ensure continued blood supply and protect the attached pedicle. Secondary Defect Length In Cm (Required For Flaps): 0.3 Consent (Spinal Accessory)/Introductory Paragraph: The rationale for Mohs was explained to the patient and consent was obtained. The risks, benefits and alternatives to therapy were discussed in detail. Specifically, the risks of damage to the spinal accessory nerve, infection, scarring, bleeding, prolonged wound healing, incomplete removal, allergy to anesthesia, and recurrence were addressed. Prior to the procedure, the treatment site was clearly identified and confirmed by the patient. All components of Universal Protocol/PAUSE Rule completed. Scc Ka Subtype Histology Text: There is well-differentiated squamous epithelium with pleomorphism and masses of keratin. Bcc  Morpheaform/Sclerosing Histology Text: Beneath an irregular epidermis, there are bizarrely shaped masses of basaloid neoplastic cells with nuclear pleomorphism attached to the basal layer and surrounded by a rapidly forming scar and mild inflammation in the dermis. The findings are typical for a basal cell carcinoma. W Plasty Text: The lesion was extirpated to the level of the fat with a #15 scalpel blade. Given the location of the defect, shape of the defect and the proximity to free margins a W-plasty was deemed most appropriate for repair. Using a sterile surgical marker, the appropriate transposition arms of the W-plasty were drawn incorporating the defect and placing the expected incisions within the relaxed skin tension lines where possible. The area thus outlined was incised deep to adipose tissue with a #15 scalpel blade. The skin margins were undermined to an appropriate distance in all directions utilizing iris scissors. The opposing transposition arms were then transposed into place in opposite direction and anchored with interrupted buried subcutaneous sutures. Donor Site Anesthesia Type: same as repair anesthesia Otolaryngologist Procedure Text (F): After obtaining clear surgical margins the patient was sent to otolaryngology for surgical repair.  The patient understands they will receive post-surgical care and follow-up from the referring physician's office. Star Wedge Flap Text: The defect edges were debeveled with a #15 scalpel blade. Given the location of the defect, shape of the defect and the proximity to free margins a star wedge flap was deemed most appropriate. Using a sterile surgical marker, an appropriate rotation flap was drawn incorporating the defect and placing the expected incisions within the relaxed skin tension lines where possible. The area thus outlined was incised deep to adipose tissue with a #15 scalpel blade. The skin margins were undermined to an appropriate distance in all directions utilizing iris scissors. Repair Hemostasis (Optional): Electrodesiccation Full Thickness Lip Wedge Repair (Flap) Text: Given the location of the defect and the proximity to free margins a full thickness wedge repair was deemed most appropriate. Using a sterile surgical marker, the appropriate repair was drawn incorporating the defect and placing the expected incisions perpendicular to the vermillion border. The vermillion border was also meticulously outlined to ensure appropriate reapproximation during the repair. The area thus outlined was incised through and through with a #15 scalpel blade. The muscularis and dermis were reaproximated with deep sutures following hemostasis. Care was taken to realign the vermillion border before proceeding with the superficial closure. Once the vermillion was realigned the superfical and mucosal closure was finished. Referred To Asc For Closure Text (Leave Blank If You Do Not Want): After obtaining clear surgical margins the patient was sent to an ASC for surgical repair.  The patient understands they will receive post-surgical care and follow-up from the ASC physician. Provider Procedure Text (A): After obtaining clear surgical margins the defect was repaired by another provider. Non-Graft Cartilage Fenestration Text: The cartilage was fenestrated with a 2mm punch biopsy to help facilitate healing. Mohs Histo Method Verbiage: Each section was then chromacoded and processed in the Mohs lab using the Mohs protocol and submitted for frozen section. Bi-Rhombic Flap Text: The defect edges were debeveled with a #15 scalpel blade. Given the location of the defect and the proximity to free margins a bi-rhombic flap was deemed most appropriate. Using a sterile surgical marker, an appropriate rhombic flap was drawn incorporating the defect. The area thus outlined was incised deep to adipose tissue with a #15 scalpel blade. The skin margins were undermined to an appropriate distance in all directions utilizing iris scissors. Mauc Instructions: By selecting yes to the question below the [facility] number will be added into the note. This will be calculated automatically based on the diagnosis chosen, the size entered, the body zone selected (H,M,L) and the specific indications you chose. You will also have the option to override the Mohs AUC if you disagree with the automatically calculated number and this option is found in the Case Summary tab. Melolabial Transposition Flap Text: The defect edges were debeveled with a #15 scalpel blade. Given the location of the defect and the proximity to free margins a melolabial flap was deemed most appropriate. Using a sterile surgical marker, an appropriate melolabial transposition flap was drawn incorporating the defect. The area thus outlined was incised deep to adipose tissue with a #15 scalpel blade. The skin margins were undermined to an appropriate distance in all directions utilizing iris scissors. Cheek-To-Nose Interpolation Flap Text: A decision was made to reconstruct the defect utilizing an interpolation axial flap and a staged reconstruction. A telfa template was made of the defect. This telfa template was then used to outline the Cheek-To-Nose Interpolation flap. The donor area for the pedicle flap was then injected with anesthesia. The flap was excised through the skin and subcutaneous tissue down to the layer of the underlying musculature. The interpolation flap was carefully excised within this deep plane to maintain its blood supply. The edges of the donor site were undermined. The donor site was closed in a primary fashion. The pedicle was then rotated into position and sutured. Once the tube was sutured into place, adequate blood supply was confirmed with blanching and refill. The pedicle was then wrapped with xeroform gauze and dressed appropriately with a telfa and gauze bandage to ensure continued blood supply and protect the attached pedicle. Mohs Case Number:  Closure 4 Information: This tab is for additional flaps and grafts above and beyond our usual structured repairs. Please note if you enter information here it will not currently bill and you will need to add the billing information manually. Bilobed Flap Text: The defect edges were debeveled with a #15 scalpel blade. Given the location of the defect and the proximity to free margins a bilobe flap was deemed most appropriate. Using a sterile surgical marker, an appropriate bilobe flap drawn around the defect. The area thus outlined was incised deep to adipose tissue with a #15 scalpel blade. The skin margins were undermined to an appropriate distance in all directions utilizing iris scissors. Double Island Pedicle Flap Text: The defect edges were debeveled with a #15 scalpel blade. Given the location of the defect, shape of the defect and the proximity to free margins a double island pedicle advancement flap was deemed most appropriate. Using a sterile surgical marker, an appropriate advancement flap was drawn incorporating the defect, outlining the appropriate donor tissue and placing the expected incisions within the relaxed skin tension lines where possible. The area thus outlined was incised deep to adipose tissue with a #15 scalpel blade. The skin margins were undermined to an appropriate distance in all directions around the primary defect and laterally outward around the island pedicle utilizing iris scissors. There was minimal undermining beneath the pedicle flap. Subsequent Stages Histo Method Verbiage: Using a similar technique to that described above, a thin layer of tissue was removed from all areas where tumor was visible on the previous stage. The tissue was again oriented, mapped, dyed, and processed as above. Mixed Nodular And Infiltrative Bcc Histology Text: There are narrow strands of spiky, irregular basal cell carcinoma cells infiltrating between collagen bundles with mucin-rich stroma Skin Substitute Text: The defect edges were debeveled with a #15 scalpel blade. Given the location of the defect, shape of the defect and the proximity to free margins a skin substitute graft was deemed most appropriate. The graft material was trimmed to fit the size of the defect. The graft was then placed in the primary defect and oriented appropriately. Mohs Method Verbiage: An incision at a 45 degree angle following the standard Mohs approach was done and the specimen was harvested as a microscopic controlled layer. Consent (Scalp)/Introductory Paragraph: The rationale for Mohs was explained to the patient and consent was obtained. The risks, benefits and alternatives to therapy were discussed in detail. Specifically, the risks of changes in hair growth pattern secondary to repair, infection, scarring, bleeding, prolonged wound healing, incomplete removal, allergy to anesthesia, nerve injury and recurrence were addressed. Prior to the procedure, the treatment site was clearly identified and confirmed by the patient. All components of Universal Protocol/PAUSE Rule completed. Mucosal Advancement Flap Text: Given the location of the defect, shape of the defect and the proximity to free margins a mucosal advancement flap was deemed most appropriate. Incisions were made with a 15 blade scalpel in the appropriate fashion along the cutaneous vermillion border and the mucosal lip. The remaining actinically damaged mucosal tissue was excised. The mucosal advancement flap was then elevated to the gingival sulcus with care taken to preserve the neurovascular structures and advanced into the primary defect. Care was taken to ensure that precise realignment of the vermillion border was achieved. Oculoplastic Surgeon Procedure Text (D): After obtaining clear surgical margins the patient was sent to oculoplastics for surgical repair.  The patient understands they will receive post-surgical care and follow-up from the referring physician's office. Anesthesia Type: 1% lidocaine with epinephrine and a 1:10 solution of 8.4% sodium bicarbonate Cartilage Graft Text: The defect edges were debeveled with a #15 scalpel blade. Given the location of the defect, shape of the defect, the fact the defect involved a full thickness cartilage defect a cartilage graft was deemed most appropriate. An appropriate donor site was identified, cleansed, and anesthetized. The cartilage graft was then harvested and transferred to the recipient site, oriented appropriately and then sutured into place. The secondary defect was then repaired using a primary closure. Partial Purse String (Simple) Text: Given the location of the defect and the characteristics of the surrounding skin a simple purse string closure was deemed most appropriate. Undermining was performed circumfirentially around the surgical defect. A purse string suture was then placed and tightened. Wound tension only allowed a partial closure of the circular defect. Bcc Infiltrative Histology Text: There were numerous aggregates of basaloid cells demonstrating an infiltrative pattern. Tumor Debulked?: scalpel Epidermal Closure: simple interrupted No Repair - Repaired With Adjacent Surgical Defect Text (Leave Blank If You Do Not Want): After obtaining clear surgical margins the defect was repaired concurrently with another surgical defect which was in close approximation. Post-Care Instructions: WOUND CARE INSTRUCTIONS\\n\\nI reviewed the post-care instructions with the patient in detail. \\n\\nKeep our initial bandage on and dry for 48-72 hours as directed. After 48-72 hours,\\n\\n1. Cleanse the wound with peroxide gently. If there is a lot of crusting/scabbing, soak the site with peroxide to soften. \\n2. Apply a generous amount of Vaseline to the surgical site. DO NOT use Neosporin. \\n3. Cover the wound with a dressing. You can use a non-stick pad with paper tape or regular bandages. \\n4. Repeat twice daily, once in the morning, once in the evening. \\n\\n\\nPAIN NMOGULR\\D\\A9. It is common to experience swelling, bruising, and drainage after surgery. To decrease pain, use extra strength Tylenol as directed on the bottle. Please do not use ibuprofen, Aleve, Motrin, or Excedrin as they may worsen bleeding. If Tylenol does not help with your pain, please call our office. Certain pain medications may require you to come to the office to  an actual paper prescription. Other intermediate (non-narcotic) strength pain medications may be called in for you if necessary. \\n\\n2. To decrease pain, patients can also try using an ice pack, a bag of frozen green peas, or a bag of frozen marshmellows. Place the ice pack on top of the bandage for 20-30 minutes, then take a break for 20-30 minutes (place the ice pack back in the freezer to get it cold again). Repeat as many times as necessary. \\n\\n\\nBLEEDING CONTROL\\n\\nIf bleeding should occur, apply firm direct pressure to the site for 30 minutes without easing up. If bleeding continues after 30 minutes, please call the office at any time. In rare instances, it may be necessary to go to the nearest emergency room. \\n\\n\\nMISCELLANEOUS INFORMATION\\n\\nThings to avoid while healing:\\n - NO heavy lifting, exercise, or swimming for the next 14 days. \\n - NO exposure to tap water for the next 48-72 hours. \\n - NO exposure to hot tub, swimming pool, or ocean water for the next 14 days. \\n\\n\\nCONTACT INFORMATION\\n\\nShould you have any questions or concerns, please call:\\n\\n1. [address] [facility] Location = [phone]\\n\\n2.  [address] = [phone] Paramedian Forehead Flap Text: A decision was made to reconstruct the defect utilizing an interpolation axial flap and a staged reconstruction. A telfa template was made of the defect. This telfa template was then used to outline the paramedian forehead pedicle flap. The donor area for the pedicle flap was then injected with anesthesia. The flap was excised through the skin and subcutaneous tissue down to the layer of the underlying musculature. The pedicle flap was carefully excised within this deep plane to maintain its blood supply. The edges of the donor site were undermined. The donor site was closed in a primary fashion. The pedicle was then rotated into position and sutured. Once the tube was sutured into place, adequate blood supply was confirmed with blanching and refill. The pedicle was then wrapped with xeroform gauze and dressed appropriately with a telfa and gauze bandage to ensure continued blood supply and protect the attached pedicle. Localized Dermabrasion Text: The patient was draped in routine manner. Localized dermabrasion using 3 x 17 mm wire brush was performed in routine manner to papillary dermis. This spot dermabrasion is being performed to complete skin cancer reconstruction. It also will eliminate the other sun damaged precancerous cells that are known to be part of the regional effect of a lifetime's worth of sun exposure. This localized dermabrasion is therapeutic and should not be considered cosmetic in any regard. Closure 3 Information: This tab is for additional flaps and grafts above and beyond our usual structured repairs.  Please note if you enter information here it will not currently bill and you will need to add the billing information manually. Scc In Situ Histology Text: Atypia of the keratinocytes across the full thickness of the epidermis with loss of the granular layer and overlying zones of parakeratosis Plastic Surgeon Procedure Text (D): After obtaining clear surgical margins the patient was sent to plastics for surgical repair.  The patient understands they will receive post-surgical care and follow-up from the referring physician's office. Scc Moderately Differentiated Histology Text: There are nests of squamous epithelial cells arising from the epidermis and extending into the dermis. The malignant cells are large with abundant eosinophilic cytoplasm and a large nucleus. Keratin pearls are also present. Stage 1: Number Of Blocks?: 1 Bilateral Helical Rim Advancement Flap Text: The defect edges were debeveled with a #15 blade scalpel. Given the location of the defect and the proximity to free margins (helical rim) a bilateral helical rim advancement flap was deemed most appropriate. Using a sterile surgical marker, the appropriate advancement flaps were drawn incorporating the defect and placing the expected incisions between the helical rim and antihelix where possible. The area thus outlined was incised through and through with a #15 scalpel blade. With a skin hook and iris scissors, the flaps were gently and sharply undermined and freed up. Graft Basting Suture (Optional): 5-0 Fast Absorbing Gut No Alternatives Discussed Intro (Do Not Add Period): I discussed alternative treatments to Mohs surgery and specifically discussed the risks and benefits of H Plasty Text: Given the location of the defect, shape of the defect and the proximity to free margins a H-plasty was deemed most appropriate for repair. Using a sterile surgical marker, the appropriate advancement arms of the H-plasty were drawn incorporating the defect and placing the expected incisions within the relaxed skin tension lines where possible. The area thus outlined was incised deep to adipose tissue with a #15 scalpel blade. The skin margins were undermined to an appropriate distance in all directions utilizing iris scissors. The opposing advancement arms were then advanced into place in opposite direction and anchored with interrupted buried subcutaneous sutures. Ear Wedge Repair Text: A wedge excision was completed by carrying down an excision through the full thickness of the ear and cartilage with an inward facing Burow's triangle. The wound was then closed in a layered fashion. Bcc  Nodular Histology Text: Nodular aggregates of basaloid cells with large, hyperchromatic, oval nuclei and little cytoplasm aligning densely in a palisade pattern at the periphery of the nest Helical Rim Advancement Flap Text: The defect edges were debeveled with a #15 blade scalpel. Given the location of the defect and the proximity to free margins (helical rim) a double helical rim advancement flap was deemed most appropriate. Using a sterile surgical marker, the appropriate advancement flaps were drawn incorporating the defect and placing the expected incisions between the helical rim and antihelix where possible. The area thus outlined was incised through and through with a #15 scalpel blade. With a skin hook and iris scissors, the flaps were gently and sharply undermined and freed up. Consent (Marginal Mandibular)/Introductory Paragraph: The rationale for Mohs was explained to the patient and consent was obtained. The risks, benefits and alternatives to therapy were discussed in detail. Specifically, the risks of damage to the marginal mandibular branch of the facial nerve, infection, scarring, bleeding, prolonged wound healing, incomplete removal, allergy to anesthesia, and recurrence were addressed. Prior to the procedure, the treatment site was clearly identified and confirmed by the patient. All components of Universal Protocol/PAUSE Rule completed. Island Pedicle Flap Text: The defect edges were debeveled with a #15 scalpel blade. Given the location of the defect, shape of the defect and the proximity to free margins an island pedicle advancement flap was deemed most appropriate. Using a sterile surgical marker, an appropriate advancement flap was drawn incorporating the defect, outlining the appropriate donor tissue and placing the expected incisions within the relaxed skin tension lines where possible. The area thus outlined was incised deep to adipose tissue with a #15 scalpel blade. The skin margins were undermined to an appropriate distance in all directions around the primary defect and laterally outward around the island pedicle utilizing iris scissors. There was minimal undermining beneath the pedicle flap. Lazy S Intermediate Repair Preamble Text (Leave Blank If You Do Not Want): Undermining was performed with blunt dissection. Bcc Infundibulocystic Histology Text: Beneath an irregular epidermis, there are small nodular masses  of basaloid neoplastic cells aggregating in a cystic formation with nuclear pleomorphism attached to the basal layer and surrounded by a loose fibrous stroma and mild inflammation in the dermis. The findings are typical for a basal cell carcinoma. Presence Of Scar Tissue (For Histology): absent Surgeon/Pathologist Verbiage (Will Incorporate Name Of Surgeon From Intro If Not Blank): operated in two distinct and integrated capacities as the surgeon and pathologist. [address] was completed at this time and should be considered part of the medical record. Refer to attached Mohs Map for additional details including but not limited to: address where microscope slide was read, patient name, patient medical record number, date of service, patient insurance, referring provider, biopsy date, tumor type, tumor site, initial pre-operative size, Mohs accession #, Mohs indications, details of Mohs procedure including stages, debulk status, tumor type / pattern / morphology, depth of invasion, inflammation, perineural invasion, scar tissue, margins status, tissue blocks, and final measurement size. Bcc Pigmented Histology Text: Functional melanocytes are scattered through the basal cell carcinoma tumor islands and there are numerous melanophages in the stroma Mohs Rapid Report Verbiage: The area of clinically evident tumor was marked with skin marking ink and appropriately hatched. The initial incision was made following the Mohs approach through the skin. The specimen was taken to the lab, divided into the necessary number of pieces, chromacoded and processed according to the Mohs protocol. This was repeated in successive stages until a tumor free defect was achieved. Mastoid Interpolation Flap Text: A decision was made to reconstruct the defect utilizing an interpolation axial flap and a staged reconstruction. A telfa template was made of the defect. This telfa template was then used to outline the mastoid interpolation flap. The donor area for the pedicle flap was then injected with anesthesia. The flap was excised through the skin and subcutaneous tissue down to the layer of the underlying musculature. The pedicle flap was carefully excised within this deep plane to maintain its blood supply. The edges of the donor site were undermined. The donor site was closed in a primary fashion. The pedicle was then rotated into position and sutured. Once the tube was sutured into place, adequate blood supply was confirmed with blanching and refill. The pedicle was then wrapped with xeroform gauze and dressed appropriately with a telfa and gauze bandage to ensure continued blood supply and protect the attached pedicle. Partial Purse String (Intermediate) Text: Given the location of the defect and the characteristics of the surrounding skin an intermediate purse string closure was deemed most appropriate. Undermining was performed circumfirentially around the surgical defect. A purse string suture was then placed and tightened. Wound tension only allowed a partial closure of the circular defect. Closure 2 Information: This tab is for additional flaps and grafts, including complex repair and grafts and complex repair and flaps. You can also specify a different location for the additional defect, if the location is the same you do not need to select a new one. We will insert the automated text for the repair you select below just as we do for solitary flaps and grafts. Please note that at this time if you select a location with a different insurance zone you will need to override the ICD10 and CPT if appropriate. O-T Advancement Flap Text: The defect edges were debeveled with a #15 scalpel blade. Given the location of the defect, shape of the defect and the proximity to free margins an O-T advancement flap was deemed most appropriate. Using a sterile surgical marker, an appropriate advancement flap was drawn incorporating the defect and placing the expected incisions within the relaxed skin tension lines where possible. The area thus outlined was incised deep to adipose tissue with a #15 scalpel blade. The skin margins were undermined to an appropriate distance in all directions utilizing iris scissors. Bcc Histology Text: Beneath an irregular epidermis, there are small nodular masses of basaloid neoplastic cells with nuclear pleomorphism attached to the basal layer and surrounded by a loose fibrous stroma and mild inflammation in the dermis. The findings are typical for a basal cell carcinoma. Epidermal Sutures: 6-0 Prolene Interpolation Flap Text: A decision was made to reconstruct the defect utilizing an interpolation axial flap and a staged reconstruction. A telfa template was made of the defect. This telfa template was then used to outline the interpolation flap. The donor area for the pedicle flap was then injected with anesthesia. The flap was excised through the skin and subcutaneous tissue down to the layer of the underlying musculature. The interpolation flap was carefully excised within this deep plane to maintain its blood supply. The edges of the donor site were undermined. The donor site was closed in a primary fashion. The pedicle was then rotated into position and sutured. Once the tube was sutured into place, adequate blood supply was confirmed with blanching and refill. The pedicle was then wrapped with xeroform gauze and dressed appropriately with a telfa and gauze bandage to ensure continued blood supply and protect the attached pedicle. Consent Type: Consent 1 (Standard) Dermal Autograft Text: The defect edges were debeveled with a #15 scalpel blade. Given the location of the defect, shape of the defect and the proximity to free margins a dermal autograft was deemed most appropriate. Using a sterile surgical marker, the primary defect shape was transferred to the donor site. The area thus outlined was incised deep to adipose tissue with a #15 scalpel blade. The harvested graft was then trimmed of adipose and epidermal tissue until only dermis was left. The skin graft was then placed in the primary defect and oriented appropriately. Cheiloplasty (Less Than 50%) Text: A decision was made to reconstruct the defect with a  cheiloplasty. The defect was undermined extensively. Additional obicularis oris muscle was excised with a 15 blade scalpel. The defect was converted into a full thickness wedge, of less than 50% of the vertical height of the lip, to facilite a better cosmetic result. Small vessels were then tied off with 5-0 monocyrl. The obicularis oris, superficial fascia, adipose and dermis were then reapproximated. After the deeper layers were approximated the epidermis was reapproximated with particular care given to realign the vermillion border. Afx Histology Text: Bizarre multinucleated tumor cells in hypercellular, spindly stroma with frequent mitotic figures. There are also smaller fibroblastic cells with pleomorphism and angulated nuclei confined to the dermis. Unna Boot Text: An Unna boot was placed to help immobilize the limb and facilitate more rapid healing. Detail Level: Detailed Dressing (No Sutures): dry sterile dressing Muscle Hinge Flap Text: The defect edges were debeveled with a #15 scalpel blade. Given the size, depth and location of the defect and the proximity to free margins a muscle hinge flap was deemed most appropriate. Using a sterile surgical marker, an appropriate hinge flap was drawn incorporating the defect. The area thus outlined was incised with a #15 scalpel blade. The skin margins were undermined to an appropriate distance in all directions utilizing iris scissors. Consent (Temporal Branch)/Introductory Paragraph: The rationale for Mohs was explained to the patient and consent was obtained. The risks, benefits and alternatives to therapy were discussed in detail. Specifically, the risks of damage to the temporal branch of the facial nerve, infection, scarring, bleeding, prolonged wound healing, incomplete removal, allergy to anesthesia, and recurrence were addressed. Prior to the procedure, the treatment site was clearly identified and confirmed by the patient. All components of Universal Protocol/PAUSE Rule completed. Date Of Previous Biopsy (Optional): 8/8/2018 Crescentic Advancement Flap Text: The defect edges were debeveled with a #15 scalpel blade. Given the location of the defect and the proximity to free margins a crescentic advancement flap was deemed most appropriate. Using a sterile surgical marker, the appropriate advancement flap was drawn incorporating the defect and placing the expected incisions within the relaxed skin tension lines where possible. The area thus outlined was incised deep to adipose tissue with a #15 scalpel blade. The skin margins were undermined to an appropriate distance in all directions utilizing iris scissors. Same Histology In Subsequent Stages Text: The pattern and morphology of the tumor is as described in the first stage. Secondary Intention Text (Leave Blank If You Do Not Want): The defect will heal with secondary intention. Split-Thickness Skin Graft Text: The defect edges were debeveled with a #15 scalpel blade. Given the location of the defect, shape of the defect and the proximity to free margins a split thickness skin graft was deemed most appropriate. Using a sterile surgical marker, the primary defect shape was transferred to the donor site. The split thickness graft was then harvested. The skin graft was then placed in the primary defect and oriented appropriately. Scc Poorly Differentiated Histology Text: There are nests of squamous epithelial cells arising from the epidermis and extending into the dermis. The malignant cells are poorly differentiated. Surgeon: Hadley Almanza M.D. Body Location Override (Optional - Billing Will Still Be Based On Selected Body Map Location If Applicable): left nasal rim Flap Type: Advancement Flap (Single) Bcc  Nodulocystic Histology Text: Beneath an irregular epidermis, there are small nodular masses of basaloid neoplastic cells aggregating in a cystic formation with nuclear pleomorphism attached to the basal layer and surrounded by a loose fibrous stroma and mild inflammation in the dermis. The findings are typical for a basal cell carcinoma. Bcc Superficial Histology Text: On the basal layer of an irregular epidermis, there are small nodular masses of basaloid neoplastic cells with nuclear pleomorphism attached to the basal layer and surrounded by a loose fibrous stroma and mild inflammation in the dermis. The findings are typical for a basal cell carcinoma. V-Y Plasty Text: The defect edges were debeveled with a #15 scalpel blade. Given the location of the defect, shape of the defect and the proximity to free margins an V-Y advancement flap was deemed most appropriate. Using a sterile surgical marker, an appropriate advancement flap was drawn incorporating the defect and placing the expected incisions within the relaxed skin tension lines where possible. The area thus outlined was incised deep to adipose tissue with a #15 scalpel blade. The skin margins were undermined to an appropriate distance in all directions utilizing iris scissors. Area H Indication Text: Tumors in this location are included in Area H (eyelids, eyebrows, nose, lips, chin, ear, pre-auricular, post-auricular, temple, genitalia, hands, feet, ankles and areola). Tissue conservation is critical in these anatomic locations. Ear Star Wedge Flap Text: The defect edges were debeveled with a #15 blade scalpel. Given the location of the defect and the proximity to free margins (helical rim) an ear star wedge flap was deemed most appropriate. Using a sterile surgical marker, the appropriate flap was drawn incorporating the defect and placing the expected incisions between the helical rim and antihelix where possible. The area thus outlined was incised through and through with a #15 scalpel blade. Hatchet Flap Text: The defect edges were debeveled with a #15 scalpel blade. Given the location of the defect, shape of the defect and the proximity to free margins a hatchet flap was deemed most appropriate. Using a sterile surgical marker, an appropriate hatchet flap was drawn incorporating the defect and placing the expected incisions within the relaxed skin tension lines where possible. The area thus outlined was incised deep to adipose tissue with a #15 scalpel blade. The skin margins were undermined to an appropriate distance in all directions utilizing iris scissors. Dorsal Nasal Flap Text: The defect edges were debeveled with a #15 scalpel blade. Given the location of the defect and the proximity to free margins a dorsal nasal flap was deemed most appropriate. Using a sterile surgical marker, an appropriate dorsal nasal flap was drawn around the defect. The area thus outlined was incised deep to adipose tissue with a #15 scalpel blade. The skin margins were undermined to an appropriate distance in all directions utilizing iris scissors. Xenograft Text: The defect edges were debeveled with a #15 scalpel blade. Given the location of the defect, shape of the defect and the proximity to free margins a xenograft was deemed most appropriate. The graft was then trimmed to fit the size of the defect. The graft was then placed in the primary defect and oriented appropriately. Burow's Advancement Flap Text: The defect edges were debeveled with a #15 scalpel blade. Given the location of the defect and the proximity to free margins a Burow's advancement flap was deemed most appropriate. Using a sterile surgical marker, the appropriate advancement flap was drawn incorporating the defect and placing the expected incisions within the relaxed skin tension lines where possible. The area thus outlined was incised deep to adipose tissue with a #15 scalpel blade. The skin margins were undermined to an appropriate distance in all directions utilizing iris scissors. Postop Diagnosis: same Island Pedicle Flap-Requiring Vessel Identification Text: The defect edges were debeveled with a #15 scalpel blade. Given the location of the defect, shape of the defect and the proximity to free margins an island pedicle advancement flap was deemed most appropriate. Using a sterile surgical marker, an appropriate advancement flap was drawn, based on the axial vessel mentioned above, incorporating the defect, outlining the appropriate donor tissue and placing the expected incisions within the relaxed skin tension lines where possible. The area thus outlined was incised deep to adipose tissue with a #15 scalpel blade. The skin margins were undermined to an appropriate distance in all directions around the primary defect and laterally outward around the island pedicle utilizing iris scissors. There was minimal undermining beneath the pedicle flap. Complex Repair And Graft Additional Text (Will Appearing After The Standard Complex Repair Text): The complex repair was not sufficient to completely close the primary defect. The remaining additional defect was repaired with the graft mentioned below. Consent 1/Introductory Paragraph: Various treatment options for skin cancer treatment; including but not limited to no treatment, cryosurgery or cryotherapy, excision, radiation therapy, electrodessication and curettage, topical therapeutic agents and light therapy were discussed in depth with the patient. The rationale for Mohs was explained to the patient and consent was obtained. The risks, benefits and alternatives to therapy were discussed in detail. Specifically, the risks of infection, scarring, bleeding, prolonged wound healing, incomplete removal, allergy to anesthesia, nerve injury and recurrence were addressed. Prior to the procedure, the treatment site was clearly identified and confirmed by the patient and the patient agreed that Mohs surgery is the best treatment option for their lesion. No guarantees were made or implied; close follow-up was stressed out to the patient. All components of Universal Protocol/PAUSE Rule completed. Cheiloplasty (Complex) Text: A decision was made to reconstruct the defect with a  cheiloplasty. The defect was undermined extensively. Additional obicularis oris muscle was excised with a 15 blade scalpel. The defect was converted into a full thickness wedge to facilite a better cosmetic result. Small vessels were then tied off with 5-0 monocyrl. The obicularis oris, superficial fascia, adipose and dermis were then reapproximated. After the deeper layers were approximated the epidermis was reapproximated with particular care given to realign the vermillion border. O-L Flap Text: The defect edges were debeveled with a #15 scalpel blade. Given the location of the defect, shape of the defect and the proximity to free margins an O-L flap was deemed most appropriate. Using a sterile surgical marker, an appropriate advancement flap was drawn incorporating the defect and placing the expected incisions within the relaxed skin tension lines where possible. The area thus outlined was incised deep to adipose tissue with a #15 scalpel blade. The skin margins were undermined to an appropriate distance in all directions utilizing iris scissors. Mercedes Flap Text: The defect edges were debeveled with a #15 scalpel blade. Given the location of the defect, shape of the defect and the proximity to free margins a Mercedes flap was deemed most appropriate. Using a sterile surgical marker, an appropriate advancement flap was drawn incorporating the defect and placing the expected incisions within the relaxed skin tension lines where possible. The area thus outlined was incised deep to adipose tissue with a #15 scalpel blade. The skin margins were undermined to an appropriate distance in all directions utilizing iris scissors. Advancement Flap (Double) Text: The defect edges were debeveled with a #15 scalpel blade. Given the location of the defect and the proximity to free margins a double advancement flap was deemed most appropriate. Using a sterile surgical marker, the appropriate advancement flaps were drawn incorporating the defect and placing the expected incisions within the relaxed skin tension lines where possible. The area thus outlined was incised deep to adipose tissue with a #15 scalpel blade. The skin margins were undermined to an appropriate distance in all directions utilizing iris scissors. Rotation Flap Text: After a lengthy discussion with the patient regarding the wound treatment reconstruction options available including but not limited to simple repair, intermediate repair, complex repair, adjacent tissue transfer, tissue graft as well as allowing the lesion to repair by secondary intention. It was determined that due to the defect location, complexity, and given indications; an adjacent tissue transfer (rotation flap) would be the most appropriate means for repair of the surgical defect as the defect extended through the skin into the subcutaneous tissues, and required rearrangement or transfer of adjacent tissue to repair the surgical wound. \\n\\n\\n\\n\\nThe defect edges were debeveled with a #15 scalpel blade. Given the location of the defect, shape of the defect and the proximity to free margins a rotation flap was deemed most appropriate. Using a sterile surgical marker, an appropriate rotation flap was drawn incorporating the defect and placing the expected incisions within the relaxed skin tension lines where possible. The area thus outlined was incised deep to adipose tissue with a #15 scalpel blade. The skin margins were undermined to an appropriate distance in all directions utilizing iris scissors. Consent (Ear)/Introductory Paragraph: The rationale for Mohs was explained to the patient and consent was obtained. The risks, benefits and alternatives to therapy were discussed in detail. Specifically, the risks of ear deformity, infection, scarring, bleeding, prolonged wound healing, incomplete removal, allergy to anesthesia, nerve injury and recurrence were addressed. Prior to the procedure, the treatment site was clearly identified and confirmed by the patient. All components of Universal Protocol/PAUSE Rule completed. Trilobed Flap Text: The defect edges were debeveled with a #15 scalpel blade. Given the location of the defect and the proximity to free margins a trilobed flap was deemed most appropriate. Using a sterile surgical marker, an appropriate trilobed flap drawn around the defect. The area thus outlined was incised deep to adipose tissue with a #15 scalpel blade. The skin margins were undermined to an appropriate distance in all directions utilizing iris scissors. Inflammation Suggestive Of Cancer Camouflage Histology Text: There was a dense lymphocytic infiltrate which prevented adequate histologic evaluation of adjacent structures. Complex Repair And Flap Additional Text (Will Appearing After The Standard Complex Repair Text): The complex repair was not sufficient to completely close the primary defect. The remaining additional defect was repaired with the flap mentioned below. Bcc Micronodular Histology Text: Beneath an irregular epidermis, there are extremely small but infiltrative nodular masses of basaloid neoplastic cells with nuclear pleomorphism attached to the basal layer and surrounded by a loose fibrous stroma and mild inflammation in the dermis. The findings are typical for a basal cell carcinoma. Graft Cartilage Fenestration Text: The cartilage was fenestrated with a 2mm punch biopsy to help facilitate graft survival and healing. Epidermal Closure Graft Donor Site (Optional): running V-Y Flap Text: The defect edges were debeveled with a #15 scalpel blade. Given the location of the defect, shape of the defect and the proximity to free margins a V-Y flap was deemed most appropriate. Using a sterile surgical marker, an appropriate advancement flap was drawn incorporating the defect and placing the expected incisions within the relaxed skin tension lines where possible. The area thus outlined was incised deep to adipose tissue with a #15 scalpel blade. The skin margins were undermined to an appropriate distance in all directions utilizing iris scissors. Ftsg Text: The defect edges were debeveled with a #15 scalpel blade. Given the location of the defect, shape of the defect and the proximity to free margins a full thickness skin graft was deemed most appropriate. Using a sterile surgical marker, the primary defect shape was transferred to the donor site. The area thus outlined was incised deep to adipose tissue with a #15 scalpel blade. The harvested graft was then trimmed of adipose tissue until only dermis and epidermis was left. The skin margins of the secondary defect were undermined to an appropriate distance in all directions utilizing iris scissors. The secondary defect was closed with interrupted buried subcutaneous sutures. The skin edges were then re-apposed with running  sutures. The skin graft was then placed in the primary defect and oriented appropriately. No Residual Tumor Seen Histology Text: There were no malignant cells seen in the sections examined. Estimated Blood Loss (Cc): minimal Repair Anesthesia Method: local infiltration Melolabial Interpolation Flap Text: A decision was made to reconstruct the defect utilizing an interpolation axial flap and a staged reconstruction. A telfa template was made of the defect. This telfa template was then used to outline the melolabial interpolation flap. The donor area for the pedicle flap was then injected with anesthesia. The flap was excised through the skin and subcutaneous tissue down to the layer of the underlying musculature. The pedicle flap was carefully excised within this deep plane to maintain its blood supply. The edges of the donor site were undermined. The donor site was closed in a primary fashion. The pedicle was then rotated into position and sutured. Once the tube was sutured into place, adequate blood supply was confirmed with blanching and refill. The pedicle was then wrapped with xeroform gauze and dressed appropriately with a telfa and gauze bandage to ensure continued blood supply and protect the attached pedicle. Hidradenocarcinoma Histology Text: On histologic exam, there are nodular, epithelioid, basaloid tumor cells with irregular infiltration of the surrounding dermis and foci of duct formation. Manual Repair Warning Statement: We plan on removing the manually selected variable below in favor of our much easier automatic structured text blocks found in the previous tab. We decided to do this to help make the flow better and give you the full power of structured data. Manual selection is never going to be ideal in our platform and I would encourage you to avoid using manual selection from this point on, especially since I will be sunsetting this feature. It is important that you do one of two things with the customized text below. First, you can save all of the text in a word file so you can have it for future reference. Second, transfer the text to the appropriate area in the Library tab. Lastly, if there is a flap or graft type which we do not have you need to let us know right away so I can add it in before the variable is hidden. No need to panic, we plan to give you roughly 6 months to make the change. Wound Care (No Sutures): Petrolatum Home Suture Removal Text: Patient was provided instructions on removing sutures and will remove their sutures at home. If they have any questions or difficulties they will call the office. Referring Physician (Optional): Aron Blonder Bence-Franco PA-C Area M Indication Text: Tumors in this location are included in Area M (cheek, forehead, scalp, neck, jawline and pretibial skin). Mohs surgery is indicated for tumors in these anatomic locations. S Plasty Text: Given the location and shape of the defect, and the orientation of relaxed skin tension lines, an S-plasty was deemed most appropriate for repair. Using a sterile surgical marker, the appropriate outline of the S-plasty was drawn, incorporating the defect and placing the expected incisions within the relaxed skin tension lines where possible. The area thus outlined was incised deep to adipose tissue with a #15 scalpel blade. The skin margins were undermined to an appropriate distance in all directions utilizing iris scissors. The skin flaps were advanced over the defect. The opposing margins were then approximated with interrupted buried subcutaneous sutures. Consent 3/Introductory Paragraph: I gave the patient a chance to ask questions they had about the procedure. Following this I explained the Mohs procedure and consent was obtained. The risks, benefits and alternatives to therapy were discussed in detail. Specifically, the risks of infection, scarring, bleeding, prolonged wound healing, incomplete removal, allergy to anesthesia, nerve injury and recurrence were addressed. Prior to the procedure, the treatment site was clearly identified and confirmed by the patient. All components of Universal Protocol/PAUSE Rule completed. Transposition Flap Text: After a lengthy discussion with the patient regarding the wound treatment reconstruction options available including but not limited to simple repair, intermediate repair, complex repair, adjacent tissue transfer, tissue graft as well as allowing the lesion to repair by secondary intention. It was determined that due to the defect location, complexity, and given indications; an adjacent tissue transfer (transposition flap) would be the most appropriate means for repair of the surgical defect as the defect extended through the skin into the subcutaneous tissues, and required rearrangement or transfer of adjacent tissue to repair the surgical wound. \\n\\n\\n\\n\\nThe defect edges were debeveled with a #15 scalpel blade. Given the location of the defect and the proximity to free margins a transposition flap was deemed most appropriate. Using a sterile surgical marker, an appropriate transposition flap was drawn incorporating the defect. The area thus outlined was incised deep to adipose tissue with a #15 scalpel blade. The skin margins were undermined to an appropriate distance in all directions utilizing iris scissors. Previous Accession (Optional): [id number] Scc Acantholytic Histology Text: There are nests of squamous epithelial cells arising from the epidermis and extending into the dermis.  The malignant cells are demonstratic acantholysis Z Plasty Text: The lesion was extirpated to the level of the fat with a #15 scalpel blade. Given the location of the defect, shape of the defect and the proximity to free margins a Z-plasty was deemed most appropriate for repair. Using a sterile surgical marker, the appropriate transposition arms of the Z-plasty were drawn incorporating the defect and placing the expected incisions within the relaxed skin tension lines where possible. The area thus outlined was incised deep to adipose tissue with a #15 scalpel blade. The skin margins were undermined to an appropriate distance in all directions utilizing iris scissors. The opposing transposition arms were then transposed into place in opposite direction and anchored with interrupted buried subcutaneous sutures. Purse String (Simple) Text: Given the location of the defect and the characteristics of the surrounding skin a pursestring closure was deemed most appropriate. Undermining was performed circumfirentially around the surgical defect. A purstring suture was then placed and tightened. Surgeon Performing Repair (Optional): Dr Amber Stern Island Pedicle Flap With Canthal Suspension Text: The defect edges were debeveled with a #15 scalpel blade. Given the location of the defect, shape of the defect and the proximity to free margins an island pedicle advancement flap was deemed most appropriate. Using a sterile surgical marker, an appropriate advancement flap was drawn incorporating the defect, outlining the appropriate donor tissue and placing the expected incisions within the relaxed skin tension lines where possible. The area thus outlined was incised deep to adipose tissue with a #15 scalpel blade. The skin margins were undermined to an appropriate distance in all directions around the primary defect and laterally outward around the island pedicle utilizing iris scissors. There was minimal undermining beneath the pedicle flap. A suspension suture was placed in the canthal tendon to prevent tension and prevent ectropion. Dressing: pressure dressing with telfa Keystone Flap Text: The defect edges were debeveled with a #15 scalpel blade. Given the location of the defect, shape of the defect a keystone flap was deemed most appropriate. Using a sterile surgical marker, an appropriate keystone flap was drawn incorporating the defect, outlining the appropriate donor tissue and placing the expected incisions within the relaxed skin tension lines where possible. The area thus outlined was incised deep to adipose tissue with a #15 scalpel blade. The skin margins were undermined to an appropriate distance in all directions around the primary defect and laterally outward around the flap utilizing iris scissors. Consent (Nose)/Introductory Paragraph: The rationale for Mohs was explained to the patient and consent was obtained. The risks, benefits and alternatives to therapy were discussed in detail. Specifically, the risks of nasal deformity, changes in the flow of air through the nose, infection, scarring, bleeding, prolonged wound healing, incomplete removal, allergy to anesthesia, nerve injury and recurrence were addressed. Prior to the procedure, the treatment site was clearly identified and confirmed by the patient. All components of Universal Protocol/PAUSE Rule completed. Location Indication Override (Is Already Calculated Based On Selected Body Location): Area H Melanoma In Situ Histology Text: Histologically, the lesion is asymmetrical, poorly circumscribed with architectural disturbance and marked cytological atypia Banner Transposition Flap Text: The defect edges were debeveled with a #15 scalpel blade. Given the location of the defect and the proximity to free margins a Banner transposition flap was deemed most appropriate. Using a sterile surgical marker, an appropriate flap drawn around the defect. The area thus outlined was incised deep to adipose tissue with a #15 scalpel blade. The skin margins were undermined to an appropriate distance in all directions utilizing iris scissors. Epidermal Autograft Text: The defect edges were debeveled with a #15 scalpel blade. Given the location of the defect, shape of the defect and the proximity to free margins an epidermal autograft was deemed most appropriate. Using a sterile surgical marker, the primary defect shape was transferred to the donor site. The epidermal graft was then harvested. The skin graft was then placed in the primary defect and oriented appropriately. Consent (Near Eyelid Margin)/Introductory Paragraph: The rationale for Mohs was explained to the patient and consent was obtained. The risks, benefits and alternatives to therapy were discussed in detail. Specifically, the risks of ectropion or eyelid deformity, infection, scarring, bleeding, prolonged wound healing, incomplete removal, allergy to anesthesia, nerve injury and recurrence were addressed. Prior to the procedure, the treatment site was clearly identified and confirmed by the patient. All components of Universal Protocol/PAUSE Rule completed. Anesthesia Volume In Cc: 3

## 2021-08-22 NOTE — ED ADULT NURSE NOTE - NSIMPLEMENTINTERV_GEN_ALL_ED
Implemented All Fall with Harm Risk Interventions:  [place] to call system. Call bell, personal items and telephone within reach. Instruct patient to call for assistance. Room bathroom lighting operational. Non-slip footwear when patient is off stretcher. Physically safe environment: no spills, clutter or unnecessary equipment. Stretcher in lowest position, wheels locked, appropriate side rails in place. Provide visual cue, wrist band, yellow gown, etc. Monitor gait and stability. Monitor for mental status changes and reorient to person, place, and time. Review medications for side effects contributing to fall risk. Reinforce activity limits and safety measures with patient and family. Provide visual clues: red socks.

## 2021-08-22 NOTE — ED PROVIDER NOTE - CARE PLAN
Principal Discharge DX:	Acute UTI  Secondary Diagnosis:	Hypothermia not due to cold exposure  Secondary Diagnosis:	Delirium   1

## 2021-08-23 PROBLEM — E55.9 VITAMIN D DEFICIENCY, UNSPECIFIED: Chronic | Status: ACTIVE | Noted: 2020-11-27

## 2021-08-23 PROBLEM — G24.5 BLEPHAROSPASM: Chronic | Status: ACTIVE | Noted: 2020-11-27

## 2021-08-23 LAB
ALBUMIN SERPL ELPH-MCNC: 3.5 G/DL — SIGNIFICANT CHANGE UP (ref 3.5–5.2)
ALP SERPL-CCNC: 107 U/L — SIGNIFICANT CHANGE UP (ref 30–115)
ALT FLD-CCNC: 17 U/L — SIGNIFICANT CHANGE UP (ref 0–41)
ANION GAP SERPL CALC-SCNC: 10 MMOL/L — SIGNIFICANT CHANGE UP (ref 7–14)
AST SERPL-CCNC: 17 U/L — SIGNIFICANT CHANGE UP (ref 0–41)
BILIRUB SERPL-MCNC: <0.2 MG/DL — SIGNIFICANT CHANGE UP (ref 0.2–1.2)
BUN SERPL-MCNC: 34 MG/DL — HIGH (ref 10–20)
CALCIUM SERPL-MCNC: 9 MG/DL — SIGNIFICANT CHANGE UP (ref 8.5–10.1)
CHLORIDE SERPL-SCNC: 110 MMOL/L — SIGNIFICANT CHANGE UP (ref 98–110)
CO2 SERPL-SCNC: 23 MMOL/L — SIGNIFICANT CHANGE UP (ref 17–32)
CREAT SERPL-MCNC: 1 MG/DL — SIGNIFICANT CHANGE UP (ref 0.7–1.5)
GLUCOSE SERPL-MCNC: 102 MG/DL — HIGH (ref 70–99)
HCT VFR BLD CALC: 33.7 % — LOW (ref 37–47)
HGB BLD-MCNC: 10.6 G/DL — LOW (ref 12–16)
MCHC RBC-ENTMCNC: 29.3 PG — SIGNIFICANT CHANGE UP (ref 27–31)
MCHC RBC-ENTMCNC: 31.5 G/DL — LOW (ref 32–37)
MCV RBC AUTO: 93.1 FL — SIGNIFICANT CHANGE UP (ref 81–99)
NRBC # BLD: 0 /100 WBCS — SIGNIFICANT CHANGE UP (ref 0–0)
PLATELET # BLD AUTO: 163 K/UL — SIGNIFICANT CHANGE UP (ref 130–400)
POTASSIUM SERPL-MCNC: 4.3 MMOL/L — SIGNIFICANT CHANGE UP (ref 3.5–5)
POTASSIUM SERPL-SCNC: 4.3 MMOL/L — SIGNIFICANT CHANGE UP (ref 3.5–5)
PROT SERPL-MCNC: 5.5 G/DL — LOW (ref 6–8)
RBC # BLD: 3.62 M/UL — LOW (ref 4.2–5.4)
RBC # FLD: 14.3 % — SIGNIFICANT CHANGE UP (ref 11.5–14.5)
SARS-COV-2 RNA SPEC QL NAA+PROBE: SIGNIFICANT CHANGE UP
SODIUM SERPL-SCNC: 143 MMOL/L — SIGNIFICANT CHANGE UP (ref 135–146)
T4 AB SER-ACNC: 6 UG/DL — SIGNIFICANT CHANGE UP (ref 4.6–12)
TSH SERPL-MCNC: 22.63 UIU/ML — HIGH (ref 0.27–4.2)
TSH SERPL-MCNC: 27.28 UIU/ML — HIGH (ref 0.27–4.2)
WBC # BLD: 4.52 K/UL — LOW (ref 4.8–10.8)
WBC # FLD AUTO: 4.52 K/UL — LOW (ref 4.8–10.8)

## 2021-08-23 PROCEDURE — 99222 1ST HOSP IP/OBS MODERATE 55: CPT | Mod: AI

## 2021-08-23 RX ORDER — SODIUM CHLORIDE 9 MG/ML
1000 INJECTION INTRAMUSCULAR; INTRAVENOUS; SUBCUTANEOUS
Refills: 0 | Status: DISCONTINUED | OUTPATIENT
Start: 2021-08-23 | End: 2021-08-26

## 2021-08-23 RX ORDER — CEFTRIAXONE 500 MG/1
1000 INJECTION, POWDER, FOR SOLUTION INTRAMUSCULAR; INTRAVENOUS EVERY 24 HOURS
Refills: 0 | Status: COMPLETED | OUTPATIENT
Start: 2021-08-23 | End: 2021-08-25

## 2021-08-23 RX ORDER — HEPARIN SODIUM 5000 [USP'U]/ML
5000 INJECTION INTRAVENOUS; SUBCUTANEOUS
Refills: 0 | Status: DISCONTINUED | OUTPATIENT
Start: 2021-08-23 | End: 2021-08-27

## 2021-08-23 RX ADMIN — SODIUM CHLORIDE 50 MILLILITER(S): 9 INJECTION INTRAMUSCULAR; INTRAVENOUS; SUBCUTANEOUS at 02:22

## 2021-08-23 RX ADMIN — HEPARIN SODIUM 5000 UNIT(S): 5000 INJECTION INTRAVENOUS; SUBCUTANEOUS at 05:51

## 2021-08-23 RX ADMIN — HEPARIN SODIUM 5000 UNIT(S): 5000 INJECTION INTRAVENOUS; SUBCUTANEOUS at 18:32

## 2021-08-23 RX ADMIN — Medication 100 MICROGRAM(S): at 22:59

## 2021-08-23 NOTE — CONSULT NOTE ADULT - SUBJECTIVE AND OBJECTIVE BOX
Patient is a 88y old  Female who presents with a chief complaint of         REVIEW OF SYSTEMS: Total of twelve systems have been reviewed with patient and found to be negative unless mentioned in HPI        PAST MEDICAL & SURGICAL HISTORY:  HTN (hypertension)  COPD (chronic obstructive pulmonary disease)  Hypothyroid  Vitamin D deficiency  Blepharospasm  History of cholecystectomy  H/O total hysterectomy  History of skin cancer        SOCIAL HISTORY  Alcohol: Does not drink  Tobacco: Does not smoke  Illicit substance use: None      FAMILY HISTORY: Non contributory to the present illness        ALLERGIES: aspirin (Stomach Upset)        Vital Signs Last 24 Hrs  T(C): 36.1 (23 Aug 2021 21:34), Max: 37 (23 Aug 2021 02:13)  T(F): 96.9 (23 Aug 2021 21:34), Max: 98.6 (23 Aug 2021 02:13)  HR: 101 (23 Aug 2021 21:34) (64 - 101)  BP: 81/51 (23 Aug 2021 21:34) (81/51 - 150/95)  BP(mean): --  RR: 18 (23 Aug 2021 20:13) (18 - 20)  SpO2: 100% (23 Aug 2021 20:13) (96% - 100%)      PHYSICAL EXAM:  GENERAL: Not in distress   CHEST/LUNG:  Aire ntry bilaterally  HEART: s1 and s2 present  ABDOMEN:  Nontender and  Nondistended  EXTREMITIES: No pedal  edema  CNS: Awake and Alert      LABS:                        10.6   4.52  )-----------( 163      ( 23 Aug 2021 08:55 )             33.7       08-23    143  |  110  |  34<H>  ----------------------------<  102<H>  4.3   |  23  |  1.0    Ca    9.0      23 Aug 2021 08:55  Mg     2.2     08-22    TPro  5.5<L>  /  Alb  3.5  /  TBili  <0.2  /  DBili  x   /  AST  17  /  ALT  17  /  AlkPhos  107  08-23    PT/INR - ( 22 Aug 2021 23:00 )   PT: 12.30 sec;   INR: 1.07 ratio      PTT - ( 22 Aug 2021 23:00 )  PTT:47.1 sec        Urinalysis Basic - ( 22 Aug 2021 22:35 )  Color: Yellow / Appearance: Slightly Cloudy / S.025 / pH: x  Gluc: x / Ketone: Negative  / Bili: Negative / Urobili: 0.2 mg/dL   Blood: x / Protein: >=300 mg/dL / Nitrite: Positive   Leuk Esterase: Moderate / RBC: 6-10 /HPF / WBC >50 /HPF   Sq Epi: x / Non Sq Epi: Few /HPF / Bacteria: Many        MEDICATIONS  (STANDING):  cefTRIAXone   IVPB 1000 milliGRAM(s) IV Intermittent every 24 hours  heparin   Injectable 5000 Unit(s) SubCutaneous two times a day  levothyroxine Injectable 100 MICROGram(s) IV Push at bedtime  sodium chloride 0.9%. 1000 milliLiter(s) (50 mL/Hr) IV Continuous <Continuous>    MEDICATIONS  (PRN):          RADIOLOGY & ADDITIONAL TESTS:             Patient is a 88y old  Female whose PMH includes HTN, dementia, COPD and Vitamin D deficiency presents to ER for evaluation of generalized weakness, trouble ambulating and "worsening dementia". Apparently fell and couldn't get up on her own. On admission, she found to have hypothermia, T of 88.2, and positive Urine analysis. She has started on Ceftriaxone and The ID consult requested to assist with further evaluation and antibiotic management.       REVIEW OF SYSTEMS: Unable to obtain due to mental status unless mentioned in HPI        PAST MEDICAL & SURGICAL HISTORY:  HTN (hypertension)  COPD (chronic obstructive pulmonary disease)  Hypothyroid  Vitamin D deficiency  Blepharospasm  History of cholecystectomy  H/O total hysterectomy  History of skin cancer        SOCIAL HISTORY  Alcohol: Does not drink  Tobacco: Does not smoke  Illicit substance use: None      FAMILY HISTORY: Non contributory to the present illness        ALLERGIES: aspirin (Stomach Upset)        Vital Signs Last 24 Hrs  T(C): 36.1 (23 Aug 2021 21:34), Max: 37 (23 Aug 2021 02:13)  T(F): 96.9 (23 Aug 2021 21:34), Max: 98.6 (23 Aug 2021 02:13)  HR: 101 (23 Aug 2021 21:34) (64 - 101)  BP: 81/51 (23 Aug 2021 21:34) (81/51 - 150/95)  BP(mean): --  RR: 18 (23 Aug 2021 20:13) (18 - 20)  SpO2: 100% (23 Aug 2021 20:13) (96% - 100%)      PHYSICAL EXAM:  GENERAL: Not in distress   CHEST/LUNG: Not using accessory muscles   HEART: s1 and s2 present  ABDOMEN:  Nontender and  Nondistended  EXTREMITIES: No pedal  edema  CNS: Awake and confused      LABS:                        10.6   4.52  )-----------( 163      ( 23 Aug 2021 08:55 )             33.7       08-23    143  |  110  |  34<H>  ----------------------------<  102<H>  4.3   |  23  |  1.0    Ca    9.0      23 Aug 2021 08:55  Mg     2.2     08-22    TPro  5.5<L>  /  Alb  3.5  /  TBili  <0.2  /  DBili  x   /  AST  17  /  ALT  17  /  AlkPhos  107  08-    PT/INR - ( 22 Aug 2021 23:00 )   PT: 12.30 sec;   INR: 1.07 ratio      PTT - ( 22 Aug 2021 23:00 )  PTT:47.1 sec        Urinalysis Basic - ( 22 Aug 2021 22:35 )  Color: Yellow / Appearance: Slightly Cloudy / S.025 / pH: x  Gluc: x / Ketone: Negative  / Bili: Negative / Urobili: 0.2 mg/dL   Blood: x / Protein: >=300 mg/dL / Nitrite: Positive   Leuk Esterase: Moderate / RBC: 6-10 /HPF / WBC >50 /HPF   Sq Epi: x / Non Sq Epi: Few /HPF / Bacteria: Many        MEDICATIONS  (STANDING):  cefTRIAXone   IVPB 1000 milliGRAM(s) IV Intermittent every 24 hours  heparin   Injectable 5000 Unit(s) SubCutaneous two times a day  levothyroxine Injectable 100 MICROGram(s) IV Push at bedtime  sodium chloride 0.9%. 1000 milliLiter(s) (50 mL/Hr) IV Continuous <Continuous>    MEDICATIONS  (PRN):        RADIOLOGY & ADDITIONAL TESTS:    < from: Xray Chest 1 View- PORTABLE-Urgent (21 @ 22:58) >  Mild prominence of the interstitial markings.    < end of copied text >  < from: Xray Pelvis AP only (21 @ 22:58) >    No acute osseous abnormality was identified, however limited evaluation of the left hemipelvis secondary to rotation.  Osteopenia.    < end of copied text >  < from: Xray Femur 2 Views, Right (21 @ 22:58) >    No acute displaced fracture demonstrated.    If symptoms persist, repeat radiographs and/or CT scan is recommended for further evaluation.        MICROBIOLOGY DATA:    COVID-19 PCR (21 @ 22:35)   COVID-19 PCR: NotDetec:

## 2021-08-23 NOTE — H&P ADULT - ASSESSMENT
Metabolic encephalopathy due to UTI    Hypothermia- suspect due to infection (would not yet classify as sepsis pending ID input)     vitamin D deficiency    history of dementia    thyroid disorder- will give replacement as IV for now    mild dehydration Metabolic encephalopathy due to UTI- monitor mental status while being treated for UTI (and dehydration)    Hypothermia- suspect due to infection (would not yet classify as sepsis pending ID input) but may also be related to her thyroid disorder- monitor on warming blanket    vitamin D deficiency- resume supplement when she "passes" dysphagia evaluation    history of dementia- nce able to take meds, resume risperidone     thyroid disorder- will give replacement as IV for now    mild dehydration- gentle hydration  Metabolic encephalopathy due to UTI- monitor mental status while being treated for UTI (and dehydration)    Hypothermia- suspect due to infection (would not yet classify as sepsis pending ID input) but may also be related to her thyroid disorder- monitor on warming blanket    vitamin D deficiency- resume supplement when she "passes" dysphagia evaluation    history of dementia- once able to take meds, resume risperidone     thyroid disorder- will give replacement as IV for now    mild dehydration- gentle hydration

## 2021-08-23 NOTE — CONSULT NOTE ADULT - ASSESSMENT
# Hypothermia   # UTI    would recommend:    1. Follow up Urine  and  Blood cultures   2. Monitor Temp. and c/w supportive care  3. Continue Ceftriaxone until work up is done    will follow the patient with you and make further recommendation based on the clinical course and Lab results  Thank you for the opportunity to participate in Ms. COOK's care      Attending Attestation:    Spent more than 65 minutes on total encounter, more than 50 % of the visit was spent counseling and/or coordinating care by the Attending physician.     Patient is a 88y old  Female whose PMH includes HTN, dementia, COPD and Vitamin D deficiency presents to ER for evaluation of generalized weakness, trouble ambulating and "worsening dementia". Apparently fell and couldn't get up on her own. On admission, she found to have hypothermia, T of 88.2, and positive Urine analysis. She has started on Ceftriaxone and The ID consult requested to assist with further evaluation and antibiotic management.     # Hypothermia   # UTI  # Metabolic encephalopathy  # S/p Fall    would recommend:    1. Follow up Urine  and  Blood cultures   2. Monitor Temp. and c/w supportive care  3. Continue Ceftriaxone until work up is done  4. Aspiration precaution  5. Fall precaution    will follow the patient with you and make further recommendation based on the clinical course and Lab results  Thank you for the opportunity to participate in Ms. COOK's care      Attending Attestation:    Spent more than 65 minutes on total encounter, more than 50 % of the visit was spent counseling and/or coordinating care by the Attending physician.

## 2021-08-23 NOTE — H&P ADULT - HISTORY OF PRESENT ILLNESS
(Currently patient is mostly sleeping, little verbal communication (keeps asking what time is it? Used ER records) 87yo female whose PMH includes HTN, dementia,               presents to ER due to weakness, trouble ambulating and "worsening dementia". Per input to ER, she presented with similar symptoms in the past when she was diagnosed with UTI (Currently patient is mostly sleeping, little verbal communication (keeps asking what time is it?) Used ER records, they obtained information from patient's family) 89yo female whose PMH includes HTN, dementia, COPD and Vitamin D deficiency presents to ER due to weakness, trouble ambulating and "worsening dementia". Apparently fell and couldn't get up on her own. Temp on arrival to hospital was 88.2. Per input to ER, she presented with similar symptoms in the past when she was diagnosed with UTI. In addition to infusion of IV antibiotics for her UTI, and IVP of levothyroxine, patient was placed on a hyperthermia blanket

## 2021-08-23 NOTE — H&P ADULT - NSHPLABSRESULTS_GEN_ALL_CORE
11.8   5.11  )-----------( 169      ( 22 Aug 2021 23:00 )             37.0         142  |  107  |  34<H>  ----------------------------<  109<H>  4.4   |  26  |  1.1    Ca    9.5      22 Aug 2021 23:00  Mg     2.2         TPro  6.2  /  Alb  3.9  /  TBili  0.2  /  DBili  x   /  AST  20  /  ALT  19  /  AlkPhos  124<H>            Urinalysis Basic - ( 22 Aug 2021 22:35 )    Color: Yellow / Appearance: Slightly Cloudy / S.025 / pH: x  Gluc: x / Ketone: Negative  / Bili: Negative / Urobili: 0.2 mg/dL   Blood: x / Protein: >=300 mg/dL / Nitrite: Positive   Leuk Esterase: Moderate / RBC: 6-10 /HPF / WBC >50 /HPF   Sq Epi: x / Non Sq Epi: Few /HPF / Bacteria: Many      PT/INR - ( 22 Aug 2021 23:00 )   PT: 12.30 sec;   INR: 1.07 ratio         PTT - ( 22 Aug 2021 23:00 )  PTT:47.1 sec  Lactate Trend   @ 23:00 Lactate:1.0     CARDIAC MARKERS ( 22 Aug 2021 23:00 )  x     / <0.01 ng/mL / 178 U/L / x     / x          CAPILLARY BLOOD GLUCOSE    < from: CT Head No Cont (21 @ 23:23) >      EXAM:  CT BRAIN          PROCEDURE DATE:  2021        < from: CT Head No Cont (21 @ 23:23) >    IMPRESSION:    No CT evidence for acute intracranial pathology.    Mild microvascular ischemic disease.    ABELARDO VOSS MD; Resident Radiologist  This document has been electronically signed.  NASIMA VALLE MD; Attending Radiologist  This document has been electronically signed. Aug 23 2021 12:50AM    < end of copied text >

## 2021-08-24 LAB
CORTIS AM PEAK SERPL-MCNC: 9.6 UG/DL — SIGNIFICANT CHANGE UP (ref 6–18.4)
T3FREE SERPL-MCNC: 1.43 PG/ML — LOW (ref 1.8–4.6)

## 2021-08-24 PROCEDURE — 99232 SBSQ HOSP IP/OBS MODERATE 35: CPT

## 2021-08-24 RX ADMIN — CEFTRIAXONE 100 MILLIGRAM(S): 500 INJECTION, POWDER, FOR SOLUTION INTRAMUSCULAR; INTRAVENOUS at 05:27

## 2021-08-24 RX ADMIN — HEPARIN SODIUM 5000 UNIT(S): 5000 INJECTION INTRAVENOUS; SUBCUTANEOUS at 17:47

## 2021-08-24 RX ADMIN — HEPARIN SODIUM 5000 UNIT(S): 5000 INJECTION INTRAVENOUS; SUBCUTANEOUS at 05:29

## 2021-08-24 RX ADMIN — SODIUM CHLORIDE 50 MILLILITER(S): 9 INJECTION INTRAMUSCULAR; INTRAVENOUS; SUBCUTANEOUS at 05:31

## 2021-08-24 RX ADMIN — Medication 100 MICROGRAM(S): at 21:35

## 2021-08-24 NOTE — PROGRESS NOTE ADULT - ASSESSMENT
Patient is a 88y old  Female whose PMH includes HTN, dementia, COPD and Vitamin D deficiency presents to ER for evaluation of generalized weakness, trouble ambulating and "worsening dementia". Apparently fell and couldn't get up on her own. On admission, she found to have hypothermia, T of 88.2, and positive Urine analysis. She has started on Ceftriaxone and The ID consult requested to assist with further evaluation and antibiotic management.     # Hypothermia   - Thyroid Stimulating Hormone, Serum: 27.28 uIU/mL (08.22.21 @ 23:00)    # Possible UTI  # Metabolic encephalopathy  # S/p Fall    Recommendations  - follow-up rest of thyroid panel   - continue ceftriaxone for now until culturse return -- if cultures are negative, would stop antibiotics  - Monitor Temp. and c/w supportive care  - Aspiration precaution    Please call or message on [employer] Teams if with any questions.  Spectra [phone]

## 2021-08-24 NOTE — PROGRESS NOTE ADULT - ASSESSMENT
#metabolic enceph improving   #dementia at baseline  #UTI - GNR 0- follow sensitivities , cont rocephin  #hypothermia due to hypothyroidism - noncompliance with med  advance diet now that more awake and can swallow  change to po synthroid     tried to contact daughter patricia for plan of dispo and GOC - left message

## 2021-08-25 LAB
-  AMIKACIN: SIGNIFICANT CHANGE UP
-  AMOXICILLIN/CLAVULANIC ACID: SIGNIFICANT CHANGE UP
-  AMPICILLIN/SULBACTAM: SIGNIFICANT CHANGE UP
-  AMPICILLIN: SIGNIFICANT CHANGE UP
-  AZTREONAM: SIGNIFICANT CHANGE UP
-  CEFAZOLIN: SIGNIFICANT CHANGE UP
-  CEFEPIME: SIGNIFICANT CHANGE UP
-  CEFOXITIN: SIGNIFICANT CHANGE UP
-  CEFTRIAXONE: SIGNIFICANT CHANGE UP
-  CIPROFLOXACIN: SIGNIFICANT CHANGE UP
-  ERTAPENEM: SIGNIFICANT CHANGE UP
-  GENTAMICIN: SIGNIFICANT CHANGE UP
-  IMIPENEM: SIGNIFICANT CHANGE UP
-  LEVOFLOXACIN: SIGNIFICANT CHANGE UP
-  MEROPENEM: SIGNIFICANT CHANGE UP
-  NITROFURANTOIN: SIGNIFICANT CHANGE UP
-  PIPERACILLIN/TAZOBACTAM: SIGNIFICANT CHANGE UP
-  TIGECYCLINE: SIGNIFICANT CHANGE UP
-  TOBRAMYCIN: SIGNIFICANT CHANGE UP
-  TRIMETHOPRIM/SULFAMETHOXAZOLE: SIGNIFICANT CHANGE UP
COVID-19 SPIKE DOMAIN AB INTERP: POSITIVE
COVID-19 SPIKE DOMAIN ANTIBODY RESULT: >250 U/ML — HIGH
CULTURE RESULTS: SIGNIFICANT CHANGE UP
METHOD TYPE: SIGNIFICANT CHANGE UP
ORGANISM # SPEC MICROSCOPIC CNT: SIGNIFICANT CHANGE UP
ORGANISM # SPEC MICROSCOPIC CNT: SIGNIFICANT CHANGE UP
SARS-COV-2 IGG+IGM SERPL QL IA: >250 U/ML — HIGH
SARS-COV-2 IGG+IGM SERPL QL IA: POSITIVE
SPECIMEN SOURCE: SIGNIFICANT CHANGE UP

## 2021-08-25 PROCEDURE — 99233 SBSQ HOSP IP/OBS HIGH 50: CPT

## 2021-08-25 RX ORDER — LEVOTHYROXINE SODIUM 125 MCG
125 TABLET ORAL DAILY
Refills: 0 | Status: DISCONTINUED | OUTPATIENT
Start: 2021-08-26 | End: 2021-08-27

## 2021-08-25 RX ORDER — CEFTRIAXONE 500 MG/1
1000 INJECTION, POWDER, FOR SOLUTION INTRAMUSCULAR; INTRAVENOUS EVERY 24 HOURS
Refills: 0 | Status: DISCONTINUED | OUTPATIENT
Start: 2021-08-26 | End: 2021-08-27

## 2021-08-25 RX ADMIN — SODIUM CHLORIDE 50 MILLILITER(S): 9 INJECTION INTRAMUSCULAR; INTRAVENOUS; SUBCUTANEOUS at 08:22

## 2021-08-25 RX ADMIN — CEFTRIAXONE 100 MILLIGRAM(S): 500 INJECTION, POWDER, FOR SOLUTION INTRAMUSCULAR; INTRAVENOUS at 06:18

## 2021-08-25 RX ADMIN — HEPARIN SODIUM 5000 UNIT(S): 5000 INJECTION INTRAVENOUS; SUBCUTANEOUS at 17:51

## 2021-08-25 RX ADMIN — HEPARIN SODIUM 5000 UNIT(S): 5000 INJECTION INTRAVENOUS; SUBCUTANEOUS at 05:07

## 2021-08-25 RX ADMIN — Medication 100 MICROGRAM(S): at 21:50

## 2021-08-25 NOTE — SWALLOW BEDSIDE ASSESSMENT ADULT - COMMENTS
patient is unarousable. despite tactile cues. discussed with RN.
unable to keep her eyes open during assessment, confused at times during assessment.

## 2021-08-25 NOTE — SWALLOW BEDSIDE ASSESSMENT ADULT - SLP PERTINENT HISTORY OF CURRENT PROBLEM
87yo female whose PMH includes HTN, dementia, COPD and Vitamin D deficiency presents to ER due to weakness, trouble ambulating and "worsening dementia". Apparently fell and couldn't get up on her own. Temp on arrival to hospital was 88.2. Per input to ER, she presented with similar symptoms in the past when she was diagnosed with UTI. In addition to infusion of IV antibiotics for her UTI, and IVP of levothyroxine, patient was placed on a hyperthermia blanket ; currently off hypothermia blanket
(Currently patient is mostly sleeping, little verbal communication (keeps asking what time is it?) Used ER records, they obtained information from patient's family) 89yo female whose PMH includes HTN, dementia, COPD and Vitamin D deficiency presents to ER due to weakness, trouble ambulating and "worsening dementia". Apparently fell and couldn't get up on her own. Temp on arrival to hospital was 88.2. Per input to ER, she presented with similar symptoms in the past when she was diagnosed with UTI. In addition to infusion of IV antibiotics for her UTI, and IVP of levothyroxine, patient was placed on a hyperthermia blanket ; patient still has hypothermia blanket on- attempted arousal 10:00am; 11:45am; [id number]

## 2021-08-25 NOTE — PROGRESS NOTE ADULT - ASSESSMENT
87yo female with PMH includes HTN, dementia, COPD and Vitamin D deficiency presents to ER due to weakness, trouble ambulating and "worsening dementia".    #metabolic encephalopathy improving   d/w speech therapist- will need re evaluation    #UTI - GNR -klebsiella- follow sensitivities , cont rocephin- D3    #hypothermia due to hypothyroidism - noncompliance with med  patient was treated IV synthroid; plan to transition to PO     #dementia at baseline    DVT px- heparin       89yo female with PMH includes HTN, dementia, COPD and Vitamin D deficiency presents to ER due to weakness, trouble ambulating and "worsening dementia".    #metabolic encephalopathy improving   d/w speech therapist- will need re evaluation    #UTI - GNR -klebsiella- follow sensitivities , cont rocephin- D3    #hypothermia due to hypothyroidism - noncompliance with med  patient was treated IV synthroid; plan to transition to PO     #dementia at baseline    DVT px- heparin  Plan of care d/w family- daughter  PT and physiatry consult

## 2021-08-26 LAB
ALBUMIN SERPL ELPH-MCNC: 3.4 G/DL — LOW (ref 3.5–5.2)
ALP SERPL-CCNC: 111 U/L — SIGNIFICANT CHANGE UP (ref 30–115)
ALT FLD-CCNC: 16 U/L — SIGNIFICANT CHANGE UP (ref 0–41)
ANION GAP SERPL CALC-SCNC: 9 MMOL/L — SIGNIFICANT CHANGE UP (ref 7–14)
AST SERPL-CCNC: 14 U/L — SIGNIFICANT CHANGE UP (ref 0–41)
BASOPHILS # BLD AUTO: 0.05 K/UL — SIGNIFICANT CHANGE UP (ref 0–0.2)
BASOPHILS NFR BLD AUTO: 1 % — SIGNIFICANT CHANGE UP (ref 0–1)
BILIRUB SERPL-MCNC: 0.2 MG/DL — SIGNIFICANT CHANGE UP (ref 0.2–1.2)
BUN SERPL-MCNC: 24 MG/DL — HIGH (ref 10–20)
CALCIUM SERPL-MCNC: 8.7 MG/DL — SIGNIFICANT CHANGE UP (ref 8.5–10.1)
CHLORIDE SERPL-SCNC: 108 MMOL/L — SIGNIFICANT CHANGE UP (ref 98–110)
CO2 SERPL-SCNC: 25 MMOL/L — SIGNIFICANT CHANGE UP (ref 17–32)
CREAT SERPL-MCNC: 1.2 MG/DL — SIGNIFICANT CHANGE UP (ref 0.7–1.5)
EOSINOPHIL # BLD AUTO: 0.15 K/UL — SIGNIFICANT CHANGE UP (ref 0–0.7)
EOSINOPHIL NFR BLD AUTO: 2.9 % — SIGNIFICANT CHANGE UP (ref 0–8)
GLUCOSE SERPL-MCNC: 94 MG/DL — SIGNIFICANT CHANGE UP (ref 70–99)
HCT VFR BLD CALC: 35.9 % — LOW (ref 37–47)
HGB BLD-MCNC: 11.6 G/DL — LOW (ref 12–16)
IMM GRANULOCYTES NFR BLD AUTO: 1.2 % — HIGH (ref 0.1–0.3)
LYMPHOCYTES # BLD AUTO: 0.99 K/UL — LOW (ref 1.2–3.4)
LYMPHOCYTES # BLD AUTO: 19.1 % — LOW (ref 20.5–51.1)
MCHC RBC-ENTMCNC: 29.7 PG — SIGNIFICANT CHANGE UP (ref 27–31)
MCHC RBC-ENTMCNC: 32.3 G/DL — SIGNIFICANT CHANGE UP (ref 32–37)
MCV RBC AUTO: 92.1 FL — SIGNIFICANT CHANGE UP (ref 81–99)
MONOCYTES # BLD AUTO: 0.42 K/UL — SIGNIFICANT CHANGE UP (ref 0.1–0.6)
MONOCYTES NFR BLD AUTO: 8.1 % — SIGNIFICANT CHANGE UP (ref 1.7–9.3)
NEUTROPHILS # BLD AUTO: 3.52 K/UL — SIGNIFICANT CHANGE UP (ref 1.4–6.5)
NEUTROPHILS NFR BLD AUTO: 67.7 % — SIGNIFICANT CHANGE UP (ref 42.2–75.2)
NRBC # BLD: 0 /100 WBCS — SIGNIFICANT CHANGE UP (ref 0–0)
PLATELET # BLD AUTO: 154 K/UL — SIGNIFICANT CHANGE UP (ref 130–400)
POTASSIUM SERPL-MCNC: 3.7 MMOL/L — SIGNIFICANT CHANGE UP (ref 3.5–5)
POTASSIUM SERPL-SCNC: 3.7 MMOL/L — SIGNIFICANT CHANGE UP (ref 3.5–5)
PROT SERPL-MCNC: 5.4 G/DL — LOW (ref 6–8)
RBC # BLD: 3.9 M/UL — LOW (ref 4.2–5.4)
RBC # FLD: 14.1 % — SIGNIFICANT CHANGE UP (ref 11.5–14.5)
SARS-COV-2 RNA SPEC QL NAA+PROBE: SIGNIFICANT CHANGE UP
SODIUM SERPL-SCNC: 142 MMOL/L — SIGNIFICANT CHANGE UP (ref 135–146)
T3 SERPL-MCNC: 65 NG/DL — SIGNIFICANT CHANGE UP
WBC # BLD: 5.19 K/UL — SIGNIFICANT CHANGE UP (ref 4.8–10.8)
WBC # FLD AUTO: 5.19 K/UL — SIGNIFICANT CHANGE UP (ref 4.8–10.8)

## 2021-08-26 PROCEDURE — 76775 US EXAM ABDO BACK WALL LIM: CPT | Mod: 26

## 2021-08-26 PROCEDURE — 99232 SBSQ HOSP IP/OBS MODERATE 35: CPT

## 2021-08-26 RX ORDER — ONDANSETRON 8 MG/1
4 TABLET, FILM COATED ORAL ONCE
Refills: 0 | Status: DISCONTINUED | OUTPATIENT
Start: 2021-08-26 | End: 2021-08-27

## 2021-08-26 RX ORDER — RISPERIDONE 4 MG/1
0.5 TABLET ORAL
Refills: 0 | Status: DISCONTINUED | OUTPATIENT
Start: 2021-08-26 | End: 2021-08-27

## 2021-08-26 RX ADMIN — CEFTRIAXONE 100 MILLIGRAM(S): 500 INJECTION, POWDER, FOR SOLUTION INTRAMUSCULAR; INTRAVENOUS at 05:27

## 2021-08-26 RX ADMIN — RISPERIDONE 0.5 MILLIGRAM(S): 4 TABLET ORAL at 17:19

## 2021-08-26 RX ADMIN — Medication 125 MICROGRAM(S): at 05:28

## 2021-08-26 RX ADMIN — SODIUM CHLORIDE 50 MILLILITER(S): 9 INJECTION INTRAMUSCULAR; INTRAVENOUS; SUBCUTANEOUS at 05:29

## 2021-08-26 RX ADMIN — HEPARIN SODIUM 5000 UNIT(S): 5000 INJECTION INTRAVENOUS; SUBCUTANEOUS at 17:19

## 2021-08-26 RX ADMIN — HEPARIN SODIUM 5000 UNIT(S): 5000 INJECTION INTRAVENOUS; SUBCUTANEOUS at 05:28

## 2021-08-26 NOTE — DIETITIAN INITIAL EVALUATION ADULT. - OTHER INFO
pt is 88 year old female with hx of HTN, dementia, COPD, vitamin D deficiency presents with weakness and worsening dementia, s/p fall found to be hypothermic with + UTI

## 2021-08-26 NOTE — CONSULT NOTE ADULT - SUBJECTIVE AND OBJECTIVE BOX
HPI: 89 yo female whose PMH includes HTN, dementia, COPD and Vitamin D deficiency morbid  obesity  presents to ER due to weakness, trouble ambulating and "worsening dementia". Apparently fell and couldn't get up on her own. Temp on arrival to hospital was 88.2. Per input to ER, she presented with similar symptoms in the past when she was diagnosed with UTI. In addition to infusion of IV antibiotics for her UTI, and IVP of levothyroxine, patient was placed on a hyperthermia blanket  ,ptn  seen at  bed  side nad  fu  command  aox2 sleepy     PTN  REFERRED TO ACUTE  REHAB  FOR  EVAL AND  TX   PAST MEDICAL & SURGICAL HISTORY:  HTN (hypertension)    COPD (chronic obstructive pulmonary disease)    Hypothyroid    Vitamin D deficiency    Blepharospasm    History of cholecystectomy    H/O total hysterectomy    History of skin cancer        Hospital Course:    TODAY'S SUBJECTIVE & REVIEW OF SYMPTOMS:     Constitutional WNL   Cardio WNL   Resp WNL   GI WNL  Heme WNL  Endo WNL  Skin WNL  MSK WNL  Neuro WNL  Cognitive WNL  Psych WNL      MEDICATIONS  (STANDING):  cefTRIAXone   IVPB 1000 milliGRAM(s) IV Intermittent every 24 hours  heparin   Injectable 5000 Unit(s) SubCutaneous two times a day  levothyroxine 125 MICROGram(s) Oral daily  levothyroxine Injectable 100 MICROGram(s) IV Push at bedtime  sodium chloride 0.9%. 1000 milliLiter(s) (50 mL/Hr) IV Continuous <Continuous>    MEDICATIONS  (PRN):      FAMILY HISTORY:  FH: hypertension        Allergies    aspirin (Stomach Upset)    Intolerances        SOCIAL HISTORY:    [  ] Etoh  [  ] Smoking  [  ] Substance abuse     Home Environment:  [  ] Home Alone  [ x ] Lives with Family  [  ] Home Health Aid    Dwelling:  [  ] Apartment  [ x ] Private House  [  ] Adult Home  [  ] Skilled Nursing Facility      [  ] Short Term  [  ] Long Term  [ - ] Stairs       Elevator [  ]    FUNCTIONAL STATUS PTA: (Check all that apply)  Ambulation: [ x  ]Independent    [  ] Dependent     [  ] Non-Ambulatory  Assistive Device: [ x ] SA Cane  [  ]  Q Cane  [  ] Walker  [  ]  Wheelchair  ADL : [  x] Independent  [  ]  Dependent       Vital Signs Last 24 Hrs  T(C): 36.1 (26 Aug 2021 04:52), Max: 36.6 (25 Aug 2021 14:35)  T(F): 97 (26 Aug 2021 04:52), Max: 97.9 (25 Aug 2021 14:35)  HR: 72 (26 Aug 2021 04:52) (72 - 76)  BP: 121/61 (26 Aug 2021 04:52) (121/61 - 151/77)  BP(mean): --  RR: 16 (26 Aug 2021 04:52) (16 - 18)  SpO2: --      PHYSICAL EXAM: Alert & Oriented X 2  GENERAL: NAD, well-groomed, well-developed  HEAD:  Atraumatic, Normocephalic  EYES: EOMI, PERRLA, conjunctiva and sclera clear  NECK: Supple, No JVD, Normal thyroid  CHEST/LUNG: Clear to percussion bilaterally; No rales, rhonchi, wheezing, or rubs  HEART: Regular rate and rhythm; No murmurs, rubs, or gallops  ABDOMEN: Soft, Nontender, Nondistended; Bowel sounds present  EXTREMITIES:  2+ Peripheral Pulses, No clubbing, cyanosis, or edema    NERVOUS SYSTEM:  Cranial Nerves 2-12 intact [ x ] Abnormal  [  ]  ROM: WFL all extremities [  ]  Abnormal [ x ]  Motor Strength: WFL all extremities  [  ]  Abnormal [ x ]  Sensation: intact to light touch [  ] Abnormal [x  ]  Reflexes: Symmetric [  ]  Abnormal [ x ]    FUNCTIONAL STATUS:  Bed Mobility: Independent [  ]  Supervision [  ]  Needs Assistance [x  ]  N/A [  ]  Transfers: Independent [  ]  Supervision [  ]  Needs Assistance [ x ]  N/A [  ]   Ambulation: Independent [  ]  Supervision [  ]  Needs Assistance [ x ]  N/A [  ]  ADL: Independent [  ] Requires Assistance [  ] N/A [ x ]  SEE PT/OT IE NOTES    LABS:                        11.6   5.19  )-----------( 154      ( 26 Aug 2021 07:35 )             35.9                 RADIOLOGY & ADDITIONAL STUDIES:< from: CT Head No Cont (08.22.21 @ 23:23) >    No CT evidence for acute intracranial pathology.    Mild microvascular ischemic disease.      < end of copied text >      Assesment:

## 2021-08-26 NOTE — PHYSICAL THERAPY INITIAL EVALUATION ADULT - GENERAL OBSERVATIONS, REHAB EVAL
11:00-11:30 Chart reviewed. Patient available to be seen for physical therapy, denies pain, confirmed with RN. Pt rec'd in bed +IV, + primafit in NAD.

## 2021-08-26 NOTE — CONSULT NOTE ADULT - ASSESSMENT
IMPRESSION: Rehab of 88  y.o  f rehab  for debility  uti  gd     PRECAUTIONS: [  ] Cardiac  [  ] Respiratory  [  ] Seizures [  ] Contact Isolation  [  ] Droplet Isolation  [ FALL ] Other    Weight Bearing Status:     RECOMMENDATION:    Out of Bed to Chair     DVT/Decubiti Prophylaxis    REHAB PLAN:     [  xx ] Bedside P/T 3-5 times a week   [   ]   Bedside O/T  2-3 times a week             [   ] No Rehab Therapy Indicated                   [   ]  Speech Therapy   Conditioning/ROM                                    ADL  Bed Mobility                                               Conditioning/ROM  Transfers                                                     Bed Mobility  Sitting /Standing Balance                         Transfers                                        Gait Training                                               Sitting/Standing Balance  Stair Training [   ]Applicable                    Home equipment Eval                                                                        Splinting  [   ] Only      GOALS:   ADL   [ x  ]   Independent                    Transfers  [x   ] Independent                          Ambulation  [ x] Independent     [  x  ] With device                            [ x  ]  CG                                                         [ x  ]  CG                                                                  [  x ] CG                            [    ] Min A                                                   [   ] Min A                                                              [   ] Min  A          DISCHARGE PLAN:   [   ]  Good candidate for Intensive Rehabilitation/Hospital based-4A [facility]                                             Will tolerate 3hrs Intensive Rehab Daily                                       [ xx   ]  Short Term Rehab in Skilled Nursing Facility and  cont  current care                                        [    ]  Home with Outpatient or VN services                                         [    ]  Possible Candidate for Intensive Hospital based Rehab

## 2021-08-26 NOTE — PHYSICAL THERAPY INITIAL EVALUATION ADULT - ADDITIONAL COMMENTS
Pt confused, indicating she lives with mother and daughter, pt indicated she amb with RW.   Pt unsure if there are steps to enter house, or to bedroom.

## 2021-08-26 NOTE — CHART NOTE - NSCHARTNOTEFT_GEN_A_CORE
Requested by Medical / Case management team to swab the patient   Patient seen at bedside resting comfortably.  COVID-19 PCR specimen collected from nare without incident.   Patient tolerated the procedure well.  Patient encouraged to contact PA with any further questions of concerns.

## 2021-08-26 NOTE — PROGRESS NOTE ADULT - ASSESSMENT
87yo female with PMH includes HTN, dementia, COPD and Vitamin D deficiency presents to ER due to weakness, trouble ambulating and worsening dementia.    #metabolic encephalopathy-secondary to UTI- improving   #UTI - GNR -klebsiella- sn seftriaxone , cont rocephin- D4    #hypothermia due to hypothyroidism - noncompliance with med  patient was treated IV synthroid; start back on PO Ltx 125 mcg    #dementia at baseline    DVT px- heparin  Plan of care d/w family  PT and physiatry consulted  discharge plan to rehab ; COVID swab done today     89yo female with PMH includes HTN, dementia, COPD and Vitamin D deficiency presents to ER due to weakness, trouble ambulating and worsening dementia.    #metabolic encephalopathy-secondary to UTI- improving   #UTI - GNR -klebsiella- sn seftriaxone , cont rocephin- D4    #hypothermia due to hypothyroidism - noncompliance with med  patient was treated IV synthroid; start back on PO Ltx 125 mcg    #dementia at baseline  restart risperidone 0.5 mg BID    DVT px- heparin  Plan of care d/w family  PT and physiatry consulted  discharge plan to rehab ; COVID swab done today     89yo female with PMH includes HTN, dementia, COPD and Vitamin D deficiency presents to ER due to weakness, trouble ambulating and worsening dementia.    #metabolic encephalopathy-secondary to UTI- improving   #UTI - GNR -klebsiella- sn seftriaxone , cont rocephin- D4  check US renal to r/o pyelo or urinary complication considering initial presentation with severe hypothermia    #hypothermia due to hypothyroidism - noncompliance with med  patient was treated IV synthroid; start back on PO Ltx 125 mcg    #dementia at baseline  restart risperidone 0.5 mg BID    DVT px- heparin  penidng: US renal  Plan of care d/w family  PT and physiatry consulted  discharge plan to rehab ; COVID swab done today

## 2021-08-26 NOTE — DIETITIAN INITIAL EVALUATION ADULT. - PERTINENT MEDS FT
MEDICATIONS  (STANDING):  cefTRIAXone   IVPB 1000 milliGRAM(s) IV Intermittent every 24 hours  heparin   Injectable 5000 Unit(s) SubCutaneous two times a day  levothyroxine 125 MICROGram(s) Oral daily  ondansetron Injectable 4 milliGRAM(s) IV Push once  risperiDONE   Tablet 0.5 milliGRAM(s) Oral two times a day

## 2021-08-26 NOTE — PHYSICAL THERAPY INITIAL EVALUATION ADULT - TRANSFER SAFETY CONCERNS NOTED: SIT/STAND, REHAB EVAL
Chief complaint: None, Follow-up hospital discharge    HPI: Ms. Barbi Rebolledo is a 88 year old  female with PMH significant for hypertension and paroxysmal atrial fibrillation.  Barbi was admitted to the hospital on 4/30 due to symptoms of fatigue and atrial fibrillation with rapid ventricular response.  In the emergency department she converted spontaneously to sinus rhythm, however the rhythm then went back to atrial fibrillation again.  She was admitted to inpatient service for observation. She was on chlorthalidone and her potassium was 2.5 on admission.  Chlorthalidone was discontinued and potassium was replaced.  She was also on diltiazem, the dose of which was increased from 300 to 360 mg for better rate control.  She was also started on apixaban 2.5 mg twice a day. No bleeding issues with apixaban.  She is here today for one-month follow-up.  She denies any recurrence in her symptoms.  Overall she is doing very well from cardiac standpoint.The patient denies a history of chest discomfort, dyspnea, PND, orthopnea, pedal edema, palpitations, lightheadedness, and syncope  The patient's risk factor profile is: (+) HTN, (-) diabetes, (-) hyperlipidemia, (-) tobacco use, (-) family Hx CAD. No prior hx of cardiac disease.    I have reviewed her echocardiogram from 5/1/2018 which shows a structurally normal heart. I have also reviewed her ECG in clinic today which shows sinus rhythm, normal PA, QRS and QTc intervals.    Medications, personal, family, and social history reviewed with patient and revised.    Interval history 6/3/2019:  The patient returns for one-year follow-up.  She is overall doing very well from cardiac standpoint.  She is on Eliquis 2.5 mg twice daily for one episode of paroxysmal atrial fibrillation in 2018.  No recurrence since then.  Denies palpitations, dizziness, syncope, chest pain, shortness of breath.  The patient reports mild lower extremity edema likely due to diltiazem she is on  for hypertension treatment.  She follows with her primary care every 6 months at [facility] and her last potassium was 4.4.  She takes hydrochlorothiazide 25 mg, diltiazem to 40 mg and potassium 20 mEq daily.  She is physically very active.  She is an artist ().    PAST MEDICAL HISTORY:  Past Medical History:   Diagnosis Date     Squamous cell carcinoma        CURRENT MEDICATIONS:  Current Outpatient Medications   Medication Sig Dispense Refill     apixaban ANTICOAGULANT (ELIQUIS) 2.5 MG tablet Take 1 tablet (2.5 mg) by mouth 2 times daily 180 tablet 0     diltiazem (CARDIZEM CD; CARTIA XT) 360 MG 24 hr CD capsule Take 1 capsule (360 mg) by mouth daily 30 capsule 11     emollient (VANICREAM) cream Apply topically daily to entire body, especially after bathing. 453 g 11     sodium fluoride dental gel (PREVIDENT) 1.1 % GEL Apply to affected area At Bedtime         PAST SURGICAL HISTORY:  Past Surgical History:   Procedure Laterality Date     MOHS MICROGRAPHIC PROCEDURE         ALLERGIES:     Allergies   Allergen Reactions     Fentanyl Other (See Comments)     Sedation     Lisinopril Swelling     Swelling of tongue     Novocain [Procaine] Other (See Comments)     Tachycardia       FAMILY HISTORY:  Family History   Problem Relation Age of Onset     Cancer No family hx of         no skin cancer     Skin Cancer No family hx of         no skin cancer         SOCIAL HISTORY:  Social History     Tobacco Use     Smoking status: Never Smoker     Smokeless tobacco: Never Used   Substance Use Topics     Alcohol use: Not on file     Drug use: Not on file       ROS:   Constitutional: No fever, chills, or sweats. Weight stable.   ENT: No visual disturbance, ear ache, epistaxis, sore throat.   Cardiovascular: As per HPI.   Respiratory: No cough, hemoptysis.    GI: No nausea, vomiting, hematemesis, melena, or hematochezia.   : No hematuria.   Integument: Negative.   Psychiatric: Negative.   Hematologic: No easy bruising, no  "easy bleeding.  Neuro: Negative.   Endocrinology: No significant heat or cold intolerance   Musculoskeletal: No myalgia.    Answers for HPI/ROS submitted by the patient on 6/3/2019  General Symptoms: No  Skin Symptoms: No  HENT Symptoms: No  EYE SYMPTOMS: No  HEART SYMPTOMS: No  LUNG SYMPTOMS: No  INTESTINAL SYMPTOMS: No  URINARY SYMPTOMS: No  GYNECOLOGIC SYMPTOMS: No  BREAST SYMPTOMS: No  SKELETAL SYMPTOMS: No  BLOOD SYMPTOMS: No  NERVOUS SYSTEM SYMPTOMS: No  MENTAL HEALTH SYMPTOMS: No  Exam:  /52 (BP Location: Right arm, Patient Position: Chair, Cuff Size: Adult Regular)   Pulse 74   Ht 1.575 m (5' 2\")   Wt 53.5 kg (118 lb)   SpO2 97%   BMI 21.58 kg/m    GENERAL APPEARANCE: healthy, alert and no distress  HEENT: no icterus, no central cyanosis  LYMPH/NECK:  JVP not elevated  RESPIRATORY: lungs clear to auscultation - no rales, rhonchi or wheezes, no use of accessory muscles, no retractions, respirations are unlabored, normal respiratory rate  CARDIOVASCULAR: regular rhythm, normal S1, S2, no S3 or S4 and no murmur, click or rub, precordium quiet with normal PMI.  EXTREMITIES: peripheral pulses normal, no edema  NEURO: alert and oriented to person/place/time, normal speech,and affect   SKIN: no ecchymoses, no rashes     I have reviewed the labs and personally reviewed the imaging below and made my comment in the assessment and plan.    Labs:  CBC RESULTS:   Lab Results   Component Value Date    WBC 9.4 05/01/2018    RBC 3.29 (L) 05/01/2018    HGB 10.3 (L) 05/01/2018    HCT 29.2 (L) 05/01/2018    MCV 89 05/01/2018    MCH 31.3 05/01/2018    MCHC 35.3 05/01/2018    RDW 12.0 05/01/2018     05/01/2018       BMP RESULTS:  Lab Results   Component Value Date     (L) 05/01/2018    POTASSIUM 3.3 (L) 05/01/2018    CHLORIDE 93 (L) 05/01/2018    CO2 29 05/01/2018    ANIONGAP 7 05/01/2018     (H) 05/01/2018    BUN 9 05/01/2018    CR 0.58 05/01/2018    GFRESTIMATED >90 05/01/2018    GFRESTBLACK >90 " 05/01/2018    DEANA 8.0 (L) 05/01/2018        INR RESULTS:  Lab Results   Component Value Date    INR 1.14 06/15/2014       Echocardiogram 5/1/2018  Left ventricular function, chamber size, wall motion, and wall thickness are  normal.The EF is 60-65%. Grade II or moderate diastolic dysfunction. No  regional wall motion abnormalities are seen.  The right ventricle is normal size. Global right ventricular function is  normal.  Mild to moderate tricuspid insufficiency is present.  The inferior vena cava was dilated at 2.9 cm without respiratory variability,  consistent with increased right atrial pressure. Estimated right atrial  pressure is 10-15 mmHg.  Trivial pericardial effusion is present.     This study was compared with the study from 9/10/10 . The IVC is more dilated  consistent with increase right atrial pressures.    EKG (personally reviewed) 6/4/2018: sinus rhythm, normal GA, QRS and QTc intervals.    Zio-Patch 5/14/2018 for 11 hours: No atrial fibrillation    Assessment and Plan:   Ms. Barbi Rebolledo is a 89 year old  female with PMH significant for hypertension and paroxysmal atrial fibrillation.  She had an episode paroxysmal atrial fibrillation with RVR in May 2018 which spontaneously converted to sinus rhythm.  She was admitted overnight. On hospital discharge chlorthalidone was discontinued due to hypokalemia and diltiazem dose was increased from 300 to 360 mg.      She followed up with her primary care since then and diltiazem dose was decreased to 240 mg due to lower extremity edema. Currently blood pressure is well controlled with diltiazem 240 and hydrochlorothiazide 25 mg.  Patient's paroxysmal atrial fibrillation was in the setting of hypokalemia for which she is taking potassium replacement now.  Last potassium was 4.4 almost 6 months ago (patient formation).  She is on apixaban 2.5 mg twice daily since last year.  Denies any bleeding episodes.    She is currently asymptomatic. No medication  changes today.    Return to clinic 1 year.    A total of 20 minutes spent face-to-face with greater than 50% of the time spent in counseling and coordinating cares of the issues above.     Please donot hesitate to contact me if you have any questions or concerns. Again, thank you for allowing me to participate in the care of your patient.    Chandni LINARES MD  [facility] Division of Cardiology  Pager [phone]       decreased step length

## 2021-08-27 ENCOUNTER — TRANSCRIPTION ENCOUNTER (OUTPATIENT)
Age: 86
End: 2021-08-27

## 2021-08-27 VITALS
TEMPERATURE: 96 F | HEART RATE: 68 BPM | RESPIRATION RATE: 16 BRPM | SYSTOLIC BLOOD PRESSURE: 142 MMHG | DIASTOLIC BLOOD PRESSURE: 69 MMHG

## 2021-08-27 LAB
ANION GAP SERPL CALC-SCNC: 9 MMOL/L — SIGNIFICANT CHANGE UP (ref 7–14)
BUN SERPL-MCNC: 22 MG/DL — HIGH (ref 10–20)
CALCIUM SERPL-MCNC: 8.6 MG/DL — SIGNIFICANT CHANGE UP (ref 8.5–10.1)
CHLORIDE SERPL-SCNC: 108 MMOL/L — SIGNIFICANT CHANGE UP (ref 98–110)
CO2 SERPL-SCNC: 26 MMOL/L — SIGNIFICANT CHANGE UP (ref 17–32)
CREAT SERPL-MCNC: 1.2 MG/DL — SIGNIFICANT CHANGE UP (ref 0.7–1.5)
GLUCOSE SERPL-MCNC: 100 MG/DL — HIGH (ref 70–99)
HCT VFR BLD CALC: 36.8 % — LOW (ref 37–47)
HGB BLD-MCNC: 11.7 G/DL — LOW (ref 12–16)
MCHC RBC-ENTMCNC: 29.5 PG — SIGNIFICANT CHANGE UP (ref 27–31)
MCHC RBC-ENTMCNC: 31.8 G/DL — LOW (ref 32–37)
MCV RBC AUTO: 92.9 FL — SIGNIFICANT CHANGE UP (ref 81–99)
NRBC # BLD: 0 /100 WBCS — SIGNIFICANT CHANGE UP (ref 0–0)
PLATELET # BLD AUTO: 150 K/UL — SIGNIFICANT CHANGE UP (ref 130–400)
POTASSIUM SERPL-MCNC: 3.7 MMOL/L — SIGNIFICANT CHANGE UP (ref 3.5–5)
POTASSIUM SERPL-SCNC: 3.7 MMOL/L — SIGNIFICANT CHANGE UP (ref 3.5–5)
RBC # BLD: 3.96 M/UL — LOW (ref 4.2–5.4)
RBC # FLD: 13.8 % — SIGNIFICANT CHANGE UP (ref 11.5–14.5)
SODIUM SERPL-SCNC: 143 MMOL/L — SIGNIFICANT CHANGE UP (ref 135–146)
WBC # BLD: 5.49 K/UL — SIGNIFICANT CHANGE UP (ref 4.8–10.8)
WBC # FLD AUTO: 5.49 K/UL — SIGNIFICANT CHANGE UP (ref 4.8–10.8)

## 2021-08-27 PROCEDURE — 99238 HOSP IP/OBS DSCHRG MGMT 30/<: CPT

## 2021-08-27 RX ADMIN — Medication 125 MICROGRAM(S): at 05:49

## 2021-08-27 RX ADMIN — RISPERIDONE 0.5 MILLIGRAM(S): 4 TABLET ORAL at 05:49

## 2021-08-27 RX ADMIN — CEFTRIAXONE 100 MILLIGRAM(S): 500 INJECTION, POWDER, FOR SOLUTION INTRAMUSCULAR; INTRAVENOUS at 05:49

## 2021-08-27 RX ADMIN — HEPARIN SODIUM 5000 UNIT(S): 5000 INJECTION INTRAVENOUS; SUBCUTANEOUS at 05:49

## 2021-08-27 NOTE — PROGRESS NOTE ADULT - PROVIDER SPECIALTY LIST ADULT
Infectious Disease
Hospitalist
Infectious Disease
Infectious Disease
Internal Medicine
Internal Medicine

## 2021-08-27 NOTE — PROGRESS NOTE ADULT - TIME BILLING
I have personally seen and examined this patient.    I have reviewed all pertinent clinical information and reviewed all relevant imaging and diagnostic studies personally.   I counseled the patient about diagnostic testing and treatment plan. All questions were answered.   I discussed recommendations with the primary team.
I have personally seen and examined this patient.    I have reviewed all pertinent clinical information and reviewed all relevant imaging and diagnostic studies personally.   I counseled the patient about diagnostic testing and treatment plan. All questions were answered.   I discussed recommendations with the primary team.

## 2021-08-27 NOTE — DISCHARGE NOTE PROVIDER - PROVIDER TOKENS
PROVIDER:[TOKEN:[[id number]],FOLLOWUP:[1-3 days]] PROVIDER:[TOKEN:[[id number]],FOLLOWUP:[1-3 days]],PROVIDER:[TOKEN:[[id number]],FOLLOWUP:[2 weeks]]

## 2021-08-27 NOTE — DISCHARGE NOTE NURSING/CASE MANAGEMENT/SOCIAL WORK - NSDCPEFALRISK_GEN_ALL_CORE
For information on Fall & injury Prevention, visit [email or web address]

## 2021-08-27 NOTE — DISCHARGE NOTE NURSING/CASE MANAGEMENT/SOCIAL WORK - PATIENT PORTAL LINK FT
You can access the FollowMyHealth Patient Portal offered by [facility] by registering at the following website: [email or web address]. By joining [facility]’s FollowMyHealth portal, you will also be able to view your health information using other applications (apps) compatible with our system.

## 2021-08-27 NOTE — DISCHARGE NOTE PROVIDER - CARE PROVIDER_API CALL
Jannette Chacko  Internal Medicine  [address]  [place] [address]  Phone: ([phone]  Fax: ()-  Follow Up Time: 1-3 days   Jannette Chacko  Internal Medicine  [address]  [place] [address]  Phone: ()-  Fax: ()-  Follow Up Time: 1-3 days    Mahad Adams)  EndocrinologyMetabDiabetes; Internal Medicine  [address] [place] [address]  Phone: [phone]  Fax: [phone]  Follow Up Time: 2 weeks

## 2021-08-27 NOTE — DISCHARGE NOTE PROVIDER - NSDCFUADDINST_GEN_ALL_CORE_FT
Clean sacrum and buttock with soap and water , pat dry then apply  triad hydrophilic dressing   Continue Pressure ulcer preventive  measures   continue skin care

## 2021-08-27 NOTE — ADVANCED PRACTICE NURSE CONSULT - ASSESSMENT
Patient is a 87yo female whose PMH includes HTN, dementia, COPD and Vitamin D deficiency presents to ER due to weakness, trouble ambulating and "worsening dementia". Apparently fell and couldn't get up on her own. Temp on arrival to hospital was 88.2. Per input to ER, she presented with similar symptoms in the past when she was diagnosed with UTI. In addition to infusion of IV antibiotics for her UTI, and IVP of levothyroxine, patient was placed on a hyperthermia blanket      PAST MEDICAL & SURGICAL HISTORY:  HTN (hypertension)  COPD (chronic obstructive pulmonary disease)  Hypothyroid  Vitamin D deficiency  Blepharospasm  History of cholecystectomy  H/O total hysterectomy  History of skin cancer    Assessment:  Patient received in bed awake , confused . Primary rn present at bedside at time of assessment                      Skin assessed-  B/l buttock and sacrum moisture associated dermatitides with partial thickness skin erosion and redness      pressure injury  #1- present on admission   Location: R ischium   Size: Length:  2x2  Tissue Description :   [ ] Necrotic   [ ] Slough   [ ] Infected   [ ] Granulation (firm, beefy red tissue)   [ ] Hypergranulation (soft, gelatinous)  [x ] Poor-Quality Granulation (pale, grey/brown/red granulation tissue)   [ ] Epithelium (pink/mauve at wound edges)  [x ] Macerated  [ ] Other: _______  Wound Exudate : None    Wound Edge):  Intact  Periwound Condition :   [x ] Maceration [ ] Excoriation [ ] Dry skin [ ] Hyperkeratosis [ ] Callus [ ] Eczema [ ] Other: _______  Pressure  Injury stage    [ ] Stage I  [x ]  Stage II  [ ]  Stage III  [ ]  Stage VI  [ ]  Suspected Deep Tissue Injury (DTI)  [ ]  Unstageable    Other Etiology:  [ ] Aterial  [ ] Venous   [ ] Surgical Incision  [ ] Other: ________

## 2021-08-27 NOTE — PROGRESS NOTE ADULT - ASSESSMENT
Patient is a 88y old  Female whose PMH includes HTN, dementia, COPD and Vitamin D deficiency presents to ER for evaluation of generalized weakness, trouble ambulating and "worsening dementia". Apparently fell and couldn't get up on her own. On admission, she found to have hypothermia, T of 88.2, and positive Urine analysis. She has started on Ceftriaxone and The ID consult requested to assist with further evaluation and antibiotic management.     # Hypothermia   - Thyroid Stimulating Hormone, Serum: 27.28 uIU/mL (08.22.21 @ 23:00)    # Possible UTI  # Metabolic encephalopathy  # S/p Fall    Recommendations  - plan for 7 day course of antibiotics, can finish with Augmentin 875/125 mg BID   - Monitor Temp. and c/w supportive care  - Aspiration precaution    Please call or message on [employer] Teams if with any questions.  Spectra [phone]

## 2021-08-27 NOTE — DISCHARGE NOTE PROVIDER - NSDCMRMEDTOKEN_GEN_ALL_CORE_FT
levothyroxine 125 mcg (0.125 mg) oral tablet: 1 tab(s) orally once a day  risperiDONE 0.5 mg oral tablet: 1 tab(s) orally 2 times a day  Vitamin D3 1000 intl units oral tablet: 1 tab(s) orally once a day   amoxicillin-clavulanate 875 mg-125 mg oral tablet: 1 tab(s) orally 2 times a day   levothyroxine 125 mcg (0.125 mg) oral tablet: 1 tab(s) orally once a day  risperiDONE 0.5 mg oral tablet: 1 tab(s) orally 2 times a day  Vitamin D3 1000 intl units oral tablet: 1 tab(s) orally once a day

## 2021-08-27 NOTE — ADVANCED PRACTICE NURSE CONSULT - RECOMMEDATIONS
Plan: Clean sacrum and buttock with soap and water , pat dry then apply  triad hydrophilic dressing   Continue Pressure ulcer preventive  measures   continue skin care   Assess wound and inform primary provider of any changes   Case discussed with primary Rn  Wound/ ostomy specialist  to f/u as needed     Offloading: [ ] Frequent position changes [ ] Devices/Equipment  Cleansing: [ ] Saline [ ] Soap/Water [ ] Other: ______  Topicals: [ ] Barrier Cream [ ] Antimicrobial [ ] Enzymatic Wound Debridement  Dressings: [ ] Dry, sterile [ ] Foam [ ] Absorbant Pads [ ] Collagenase

## 2021-08-27 NOTE — DISCHARGE NOTE PROVIDER - NSDCCPCAREPLAN_GEN_ALL_CORE_FT
PRINCIPAL DISCHARGE DIAGNOSIS  Diagnosis: Acute UTI  Assessment and Plan of Treatment: -  - Your urinalysis was positive for UTI. You were started on Ceftriaxone. Your urine culture grew as Gram negative katy Klebsiella Pneumonae, which is sensitive for Ceftriaxone. You will be discharged with oral Antibiotics.       PRINCIPAL DISCHARGE DIAGNOSIS  Diagnosis: Acute UTI  Assessment and Plan of Treatment: Your urinalysis was positive for UTI. You were treated with Ceftriaxone. Your urine culture grew as Gram negative katy Klebsiella Pneumonae, which is sensitive for Ceftriaxone. You will be discharged with oral Antibiotics for 2 more days.

## 2021-08-27 NOTE — PROGRESS NOTE ADULT - SUBJECTIVE AND OBJECTIVE BOX
CATALINA COOK  88y, Female  Allergy: aspirin (Stomach Upset)      CHIEF COMPLAINT:     HPI:  (Currently patient is mostly sleeping, little verbal communication (keeps asking what time is it?) Used ER records, they obtained information from patient's family) 89yo female whose PMH includes HTN, dementia, COPD and Vitamin D deficiency presents to ER due to weakness, trouble ambulating and "worsening dementia". Apparently fell and couldn't get up on her own. Temp on arrival to hospital was 88.2. Per input to ER, she presented with similar symptoms in the past when she was diagnosed with UTI. In addition to infusion of IV antibiotics for her UTI, and IVP of levothyroxine, patient was placed on a hyperthermia blanket  (23 Aug 2021 01:51)    HPI:    FAMILY HISTORY:  FH: hypertension      PAST MEDICAL & SURGICAL HISTORY:  HTN (hypertension)    COPD (chronic obstructive pulmonary disease)    Hypothyroid    Vitamin D deficiency    Blepharospasm    History of cholecystectomy    H/O total hysterectomy    History of skin cancer        SOCIAL HISTORY  Social History:  Currently unable to obtain (23 Aug 2021 01:51)        ROS  General: Denies fevers, chills, nightsweats, weight loss  HEENT: Denies headache, rhinorrhea, sore throat, eye pain  CV: Denies CP, palpitations  PULM: Denies SOB, cough  GI: Denies abdominal pain, diarrhea  : Denies dysuria, hematuria  MSK: Denies arthralgias  SKIN: Denies rash   NEURO: Denies paresthesias, weakness  PSYCH: Denies depression    VITALS:  T(F): 96.7, Max: 96.9 (21 @ 21:34)  HR: 82  BP: 136/66  RR: 18Vital Signs Last 24 Hrs  T(C): 35.9 (24 Aug 2021 13:31), Max: 36.1 (23 Aug 2021 21:34)  T(F): 96.7 (24 Aug 2021 13:31), Max: 96.9 (23 Aug 2021 21:34)  HR: 82 (24 Aug 2021 13:31) (82 - 101)  BP: 136/66 (24 Aug 2021 13:31) (81/51 - 149/83)  BP(mean): --  RR: 18 (24 Aug 2021 13:31) (18 - 18)  SpO2: 96% (24 Aug 2021 08:30) (96% - 100%)    PHYSICAL EXAM:  Gen: NAD, resting in bedmuch more awake and conversing - able to tell me she is in hospital and daughter name is patricia and that she is not home right now   HEENT: Normocephalic, atraumatic  Neck: supple, no lymphadenopathy  CV: Regular rate & regular rhythm  Lungs: decreased BS at bases, no fremitus  Abdomen: Soft, BS present obse   Ext: Warm, well perfused  Neuro: non focal, awake  Psych: no SI, HI, Hallucination     TESTS & MEASUREMENTS:                        10.6   4.52  )-----------( 163      ( 23 Aug 2021 08:55 )             33.7     08    143  |  110  |  34<H>  ----------------------------<  102<H>  4.3   |  23  |  1.0    Ca    9.0      23 Aug 2021 08:55  Mg     2.2         TPro  5.5<L>  /  Alb  3.5  /  TBili  <0.2  /  DBili  x   /  AST  17  /  ALT  17  /  AlkPhos  107        LIVER FUNCTIONS - ( 23 Aug 2021 08:55 )  Alb: 3.5 g/dL / Pro: 5.5 g/dL / ALK PHOS: 107 U/L / ALT: 17 U/L / AST: 17 U/L / GGT: x           Urinalysis Basic - ( 22 Aug 2021 22:35 )    Color: Yellow / Appearance: Slightly Cloudy / S.025 / pH: x  Gluc: x / Ketone: Negative  / Bili: Negative / Urobili: 0.2 mg/dL   Blood: x / Protein: >=300 mg/dL / Nitrite: Positive   Leuk Esterase: Moderate / RBC: 6-10 /HPF / WBC >50 /HPF   Sq Epi: x / Non Sq Epi: Few /HPF / Bacteria: Many        Culture - Urine (collected 21 @ 22:35)  Source: Catheterized Catheterized  Preliminary Report (21 @ 16:19):    >100,000 CFU/ml Gram Negative Rods        Lactate, Blood: 1.0 mmol/L (21 @ 23:00)  Blood Gas Venous - Lactate: 1.0 mmoL/L (21 @ 22:52)    QRS axis to [] ° and NSR at a rate of [] BPM. There was no atrial enlargement. There was no ventricular hypertrophy. There were no ST-T changes and all intervals were normal.      INFECTIOUS DISEASES TESTING      RADIOLOGY & ADDITIONAL TESTS:  I have personally reviewed the last Chest xray  CXR  Xray Chest 1 View- PORTABLE-Urgent:   EXAM:  XR CHEST PORTABLE URGENT 1V            PROCEDURE DATE:  2021            INTERPRETATION:  Clinical History / Reason for exam: Weakness, trauma.    Comparison : Chest radiograph frontal view dated 2020 time 8:02 PM.    Technique/Positioning: Erect portable time 10:32 PM, poor inspiratory effort.    Findings:    EKG leads overlie the thorax.    The heart size is within normal limits.    Calcification within the thoracic aorta.    Mild prominence of the interstitial markings.    Degenerative changes of the acromioclavicular joints and thoracic spine.    Impression:    Mild prominence of the interstitial markings.    --- End of Report ---              ANNE MARIE SAINI MD; Attending Radiologist  This document has been electronically signed. Aug 23 2021  9:45AM (21 @ 22:58)      CT      CARDIOLOGY TESTING  12 Lead ECG:   Ventricular Rate 85 BPM    Atrial Rate 250 BPM    QRS Duration 88 ms    Q-T Interval 388 ms    QTC Calculation(Bazett) 461 ms    R Axis -32 degrees    T Axis 259 degrees    Diagnosis Line Atrial fibrillation with a competing junctional pacemaker with premature  ventricular or aberrantly conducted complexes  Left axis deviation  Septal infarct , age undetermined  Abnormal ECG    Confirmed by JOANA ERAZO MD ([id number]) on 2021 11:25:42 AM (21 @ 21:52)      MEDICATIONS  cefTRIAXone   IVPB 1000  heparin   Injectable 5000  levothyroxine Injectable 100  sodium chloride 0.9%. 1000      ANTIBIOTICS:  cefTRIAXone   IVPB 1000 milliGRAM(s) IV Intermittent every 24 hours      All available historical data has been reviewed    ASSESSMENT  88y F admitted with ACUTE UTI;HYPOTHERMIA NOT DUE TO COLD EXPOSURE;DELIRIUM        PROBLEMS  
HPI  Patient is a 88y old Female who presents with a chief complaint of   Currently admitted to medicine with the primary diagnosis of Acute UTI       Today is hospital day 2d.     INTERVAL HPI / OVERNIGHT EVENTS:  Patient was examined and seen at bedside.   patient feeling better  denies any complaints      PAST MEDICAL & SURGICAL HISTORY  HTN (hypertension)    COPD (chronic obstructive pulmonary disease)    Hypothyroid    Vitamin D deficiency    Blepharospasm    History of cholecystectomy    H/O total hysterectomy    History of skin cancer      ALLERGIES  aspirin (Stomach Upset)    MEDICATIONS  STANDING MEDICATIONS  heparin   Injectable 5000 Unit(s) SubCutaneous two times a day  levothyroxine Injectable 100 MICROGram(s) IV Push at bedtime  sodium chloride 0.9%. 1000 milliLiter(s) IV Continuous <Continuous>    PRN MEDICATIONS    VITALS:  T(F): 97.9  HR: 76  BP: 131/90  RR: 18  SpO2: 95%    PHYSICAL EXAM  GEN: NAD, sleeping; but was arousable  PULM: Clear to auscultation bilaterally, No wheezing  CVS: Regular rate and rhythm, S1-S2, no rubs or gallops  ABD: Soft, non-tender, non-distended, no guarding  EXT: No edema  NEURO: A&Ox2, moving all extremitieis; generalized weakness present    LABS                    Culture - Blood (collected 22 Aug 2021 23:00)  Source: .Blood Blood  Preliminary Report (24 Aug 2021 18:01):    No growth to date.    Culture - Blood (collected 22 Aug 2021 23:00)  Source: .Blood Blood  Preliminary Report (24 Aug 2021 18:01):    No growth to date.    Culture - Urine (collected 22 Aug 2021 22:35)  Source: Catheterized Catheterized  Final Report (25 Aug 2021 12:40):    >100,000 CFU/ml Klebsiella pneumoniae  Organism: Klebsiella pneumoniae (25 Aug 2021 12:40)  Organism: Klebsiella pneumoniae (25 Aug 2021 12:40)          RADIOLOGY    
HPI  Patient is a 88y old Female who presents with a chief complaint of weakness (26 Aug 2021 08:34)    Currently admitted to medicine with the primary diagnosis of Acute UTI       Today is hospital day 3d.     INTERVAL HPI / OVERNIGHT EVENTS:  Patient was examined and seen at bedside. feeling better  no chest pain or SOB  no N/V        PAST MEDICAL & SURGICAL HISTORY  HTN (hypertension)    COPD (chronic obstructive pulmonary disease)    Hypothyroid    Vitamin D deficiency    Blepharospasm    History of cholecystectomy    H/O total hysterectomy    History of skin cancer      ALLERGIES  aspirin (Stomach Upset)    MEDICATIONS  STANDING MEDICATIONS  cefTRIAXone   IVPB 1000 milliGRAM(s) IV Intermittent every 24 hours  heparin   Injectable 5000 Unit(s) SubCutaneous two times a day  levothyroxine 125 MICROGram(s) Oral daily  ondansetron Injectable 4 milliGRAM(s) IV Push once  risperiDONE   Tablet 0.5 milliGRAM(s) Oral two times a day    PRN MEDICATIONS    VITALS:  T(F): 96.1  HR: 76  BP: 145/69  RR: 18  SpO2: --    PHYSICAL EXAM  GEN: was sleeping; easily arousable; no distress and is comfortable  PULM: Clear to auscultation bilaterally, No wheezing  CVS: Regular rate and rhythm, S1-S2, no murmurs  ABD: Soft, non-tender, non-distended, no guarding  EXT: No edema  NEURO: A&Ox2, moving all extremity; left LE weaker ROM limited;  strength b/l 4/5    LABS                        11.6   5.19  )-----------( 154      ( 26 Aug 2021 07:35 )             35.9     08-26    142  |  108  |  24<H>  ----------------------------<  94  3.7   |  25  |  1.2    Ca    8.7      26 Aug 2021 07:35    TPro  5.4<L>  /  Alb  3.4<L>  /  TBili  0.2  /  DBili  x   /  AST  14  /  ALT  16  /  AlkPhos  111  08-26                  RADIOLOGY    
The chart has been reviewed.      # Hypothermia   # UTI    would recommend:    1. Follow up Urine  and  Blood cultures   2. Monitor Temp. and c/w supportive care  3. Continue Ceftriaxone until work up is done    - Full consult to follow    Thank you 
JOEY CATALINA  88y, Female  Allergy: aspirin (Stomach Upset)      LOS  1d    CHIEF COMPLAINT:     INTERVAL EVENTS/HPI  - No acute events overnight  - T(F): , Max: 96.9 (21 @ 21:34)  - difficult to awake   - WBC Count: 4.52 (21 @ 08:55)  WBC Count: 5.11 (21 @ 23:00)     - Creatinine, Serum: 1.0 (21 @ 08:55)  Creatinine, Serum: 1.1 (21 @ 23:00)       ROS  unable to obtain history secondary to patient's mental status    VITALS:  T(F): 96, Max: 96.9 (21 @ 21:34)  HR: 83  BP: 149/83  RR: 18Vital Signs Last 24 Hrs  T(C): 35.6 (24 Aug 2021 04:57), Max: 36.1 (23 Aug 2021 21:34)  T(F): 96 (24 Aug 2021 04:57), Max: 96.9 (23 Aug 2021 21:34)  HR: 83 (24 Aug 2021 04:57) (83 - 101)  BP: 149/83 (24 Aug 2021 04:57) (81/51 - 149/83)  BP(mean): --  RR: 18 (24 Aug 2021 08:30) (18 - 20)  SpO2: 96% (24 Aug 2021 08:30) (96% - 100%)    PHYSICAL EXAM:  Gen: NAD, resting in bed  HEENT: Normocephalic, atraumatic  Neck: supple, no lymphadenopathy  CV: Regular rate & regular rhythm  Lungs: decreased BS at bases, no fremitus  Abdomen: Soft, BS present  Ext: Warm, well perfused  Neuro: non focal, awake  Skin: no rash, no erythema  Lines: no phlebitis    FH: Non-contributory  Social Hx: Non-contributory    TESTS & MEASUREMENTS:                        10.6   4.52  )-----------( 163      ( 23 Aug 2021 08:55 )             33.7     08    143  |  110  |  34<H>  ----------------------------<  102<H>  4.3   |  23  |  1.0    Ca    9.0      23 Aug 2021 08:55  Mg     2.2         TPro  5.5<L>  /  Alb  3.5  /  TBili  <0.2  /  DBili  x   /  AST  17  /  ALT  17  /  AlkPhos  107        LIVER FUNCTIONS - ( 23 Aug 2021 08:55 )  Alb: 3.5 g/dL / Pro: 5.5 g/dL / ALK PHOS: 107 U/L / ALT: 17 U/L / AST: 17 U/L / GGT: x           Urinalysis Basic - ( 22 Aug 2021 22:35 )    Color: Yellow / Appearance: Slightly Cloudy / S.025 / pH: x  Gluc: x / Ketone: Negative  / Bili: Negative / Urobili: 0.2 mg/dL   Blood: x / Protein: >=300 mg/dL / Nitrite: Positive   Leuk Esterase: Moderate / RBC: 6-10 /HPF / WBC >50 /HPF   Sq Epi: x / Non Sq Epi: Few /HPF / Bacteria: Many          Lactate, Blood: 1.0 mmol/L (21 @ 23:00)  Blood Gas Venous - Lactate: 1.0 mmoL/L (21 @ 22:52)      INFECTIOUS DISEASES TESTING  COVID-19 PCR: NotDetec (21 @ 22:35)  Rapid RVP Result: NotDetec (20 @ 19:10)      INFLAMMATORY MARKERS      RADIOLOGY & ADDITIONAL TESTS:  I have personally reviewed the last available Chest xray  CXR  Xray Chest 1 View- PORTABLE-Urgent:   EXAM:  XR CHEST PORTABLE URGENT 1V            PROCEDURE DATE:  2021            INTERPRETATION:  Clinical History / Reason for exam: Weakness, trauma.    Comparison : Chest radiograph frontal view dated 2020 time 8:02 PM.    Technique/Positioning: Erect portable time 10:32 PM, poor inspiratory effort.    Findings:    EKG leads overlie the thorax.    The heart size is within normal limits.    Calcification within the thoracic aorta.    Mild prominence of the interstitial markings.    Degenerative changes of the acromioclavicular joints and thoracic spine.    Impression:    Mild prominence of the interstitial markings.    --- End of Report ---              ANNE MARIE SAINI MD; Attending Radiologist  This document has been electronically signed. Aug 23 2021  9:45AM (21 @ 22:58)      CT      CARDIOLOGY TESTING  12 Lead ECG:   Ventricular Rate 85 BPM    Atrial Rate 250 BPM    QRS Duration 88 ms    Q-T Interval 388 ms    QTC Calculation(Bazett) 461 ms    R Axis -32 degrees    T Axis 259 degrees    Diagnosis Line Atrial fibrillation with a competing junctional pacemaker with premature  ventricular or aberrantly conducted complexes  Left axis deviation  Septal infarct , age undetermined  Abnormal ECG    Confirmed by JOANA ERAZO MD ([id number]) on 2021 11:25:42 AM (21 @ 21:52)      MEDICATIONS  cefTRIAXone   IVPB 1000 IV Intermittent every 24 hours  heparin   Injectable 5000 SubCutaneous two times a day  levothyroxine Injectable 100 IV Push at bedtime  sodium chloride 0.9%. 1000 IV Continuous <Continuous>      WEIGHT  Weight (kg): 99.8 (21 @ 22:00)      ANTIBIOTICS:  cefTRIAXone   IVPB 1000 milliGRAM(s) IV Intermittent every 24 hours      All available historical records have been reviewed      
CATALINA COOK  88y, Female  Allergy: aspirin (Stomach Upset)      LOS  4d    CHIEF COMPLAINT: UTI (27 Aug 2021 11:39)      INTERVAL EVENTS/HPI  - No acute events overnight  - T(F): , Max: 96.2 (08-26-21 @ 21:09)  - Denies any worsening symptoms  - Tolerating medication  - WBC Count: 5.49 (08-27-21 @ 06:59)  WBC Count: 5.19 (08-26-21 @ 07:35)     - Creatinine, Serum: 1.2 (08-27-21 @ 06:59)  Creatinine, Serum: 1.2 (08-26-21 @ 07:35)       ROS  General: Denies rigors, nightsweats  HEENT: Denies headache, rhinorrhea, sore throat, eye pain  CV: Denies CP, palpitations  PULM: Denies wheezing, hemoptysis  GI: Denies hematemesis, hematochezia, melena  : Denies discharge, hematuria  MSK: Denies arthralgias, myalgias  SKIN: Denies rash, lesions  NEURO: Denies paresthesias, weakness  PSYCH: Denies depression, anxiety    VITALS:  T(F): 96, Max: 96.2 (08-26-21 @ 21:09)  HR: 72  BP: 149/71  RR: 16Vital Signs Last 24 Hrs  T(C): 35.6 (27 Aug 2021 05:09), Max: 35.7 (26 Aug 2021 21:09)  T(F): 96 (27 Aug 2021 05:09), Max: 96.2 (26 Aug 2021 21:09)  HR: 72 (27 Aug 2021 05:09) (72 - 81)  BP: 149/71 (27 Aug 2021 05:09) (145/69 - 189/81)  BP(mean): --  RR: 16 (27 Aug 2021 05:09) (16 - 18)  SpO2: --    PHYSICAL EXAM:  Gen: NAD, resting in bed  HEENT: Normocephalic, atraumatic  Neck: supple, no lymphadenopathy  CV: Regular rate & regular rhythm  Lungs: decreased BS at bases, no fremitus  Abdomen: Soft, BS present  Ext: Warm, well perfused  Neuro: non focal, awake  Skin: no rash, no erythema  Lines: no phlebitis    FH: Non-contributory  Social Hx: Non-contributory    TESTS & MEASUREMENTS:                        11.7   5.49  )-----------( 150      ( 27 Aug 2021 06:59 )             36.8     08-27    143  |  108  |  22<H>  ----------------------------<  100<H>  3.7   |  26  |  1.2    Ca    8.6      27 Aug 2021 06:59    TPro  5.4<L>  /  Alb  3.4<L>  /  TBili  0.2  /  DBili  x   /  AST  14  /  ALT  16  /  AlkPhos  111  08-26    eGFR if Non African American: 40 mL/min/1.73M2 (08-27-21 @ 06:59)  eGFR if : 47 mL/min/1.73M2 (08-27-21 @ 06:59)    LIVER FUNCTIONS - ( 26 Aug 2021 07:35 )  Alb: 3.4 g/dL / Pro: 5.4 g/dL / ALK PHOS: 111 U/L / ALT: 16 U/L / AST: 14 U/L / GGT: x               Culture - Blood (collected 08-22-21 @ 23:00)  Source: .Blood Blood  Preliminary Report (08-24-21 @ 18:01):    No growth to date.    Culture - Blood (collected 08-22-21 @ 23:00)  Source: .Blood Blood  Preliminary Report (08-24-21 @ 18:01):    No growth to date.    Culture - Urine (collected 08-22-21 @ 22:35)  Source: Catheterized Catheterized  Final Report (08-25-21 @ 12:40):    >100,000 CFU/ml Klebsiella pneumoniae  Organism: Klebsiella pneumoniae (08-25-21 @ 12:40)  Organism: Klebsiella pneumoniae (08-25-21 @ 12:40)      -  Amikacin: S <=16      -  Amoxicillin/Clavulanic Acid: S <=8/4      -  Ampicillin: R >16 These ampicillin results predict results for amoxicillin      -  Ampicillin/Sulbactam: S <=4/2 Enterobacter, Citrobacter, and Serratia may develop resistance during prolonged therapy (3-4 days)      -  Aztreonam: S <=4      -  Cefazolin: S <=2 (MIC_CL_COM_ENTERIC_[id number]) For uncomplicated UTI with K. pneumoniae, E. coli, or P. mirablis: PENNIE <=16 is sensitive and PENNIE >=32 is resistant. This also predicts results for oral agents cefaclor, cefdinir, cefpodoxime, cefprozil, cefuroxime axetil, cephalexin and locarbef for uncomplicated UTI. Note that some isolates may be susceptible to these agents while testing resistant to cefazolin.      -  Cefepime: S <=2      -  Cefoxitin: S <=8      -  Ceftriaxone: S <=1 Enterobacter, Citrobacter, and Serratia may develop resistance during prolonged therapy      -  Ciprofloxacin: S <=0.25      -  Ertapenem: S <=0.5      -  Gentamicin: S <=2      -  Imipenem: S <=1      -  Levofloxacin: S <=0.5      -  Meropenem: S <=1      -  Nitrofurantoin: S <=32 Should not be used to treat pyelonephritis      -  Piperacillin/Tazobactam: S <=8      -  Tigecycline: S <=2      -  Tobramycin: S <=2      -  Trimethoprim/Sulfamethoxazole: S <=0.5/9.5      Method Type: PENNIE        Lactate, Blood: 1.0 mmol/L (08-22-21 @ 23:00)  Blood Gas Venous - Lactate: 1.0 mmoL/L (08-22-21 @ 22:52)      INFECTIOUS DISEASES TESTING  COVID-19 PCR: NotDetec (08-26-21 @ 14:00)  COVID-19 PCR: NotDetec (08-22-21 @ 22:35)  Rapid RVP Result: NotDetec (11-26-20 @ 19:10)      INFLAMMATORY MARKERS      RADIOLOGY & ADDITIONAL TESTS:  I have personally reviewed the last available Chest xray  CXR      CT      CARDIOLOGY TESTING  12 Lead ECG:   Ventricular Rate 85 BPM    Atrial Rate 250 BPM    QRS Duration 88 ms    Q-T Interval 388 ms    QTC Calculation(Bazett) 461 ms    R Axis -32 degrees    T Axis 259 degrees    Diagnosis Line Atrial fibrillation with a competing junctional pacemaker with premature  ventricular or aberrantly conducted complexes  Left axis deviation  Septal infarct , age undetermined  Abnormal ECG    Confirmed by JOANA ERAZO MD ([id number]) on 8/23/2021 11:25:42 AM (08-22-21 @ 21:52)      MEDICATIONS  cefTRIAXone   IVPB 1000 IV Intermittent every 24 hours  heparin   Injectable 5000 SubCutaneous two times a day  levothyroxine 125 Oral daily  ondansetron Injectable 4 IV Push once  risperiDONE   Tablet 0.5 Oral two times a day      WEIGHT  Weight (kg): 99.8 (08-22-21 @ 22:00)  Creatinine, Serum: 1.2 mg/dL (08-27-21 @ 06:59)      ANTIBIOTICS:  cefTRIAXone   IVPB 1000 milliGRAM(s) IV Intermittent every 24 hours      All available historical records have been reviewed

## 2021-08-27 NOTE — DISCHARGE NOTE PROVIDER - HOSPITAL COURSE
88 year old female with a past medical history of HTN, dementia, COPD and Vitamin D deficiency presented to ER with complaints of weakness, trouble ambulating and "worsening dementia". UA was positive for Urine tract infection. Patient was admitted for metabolic encephalopathy secondary to UTI. You were treated with Ceftriaxone. Urine cultures revealed Gram Negative Rods - Klebsiella and sensitive to Ceftriaxone. Patient's condition was improving. Patient will be discharged on oral Antibiotics. Patient's hospital course was complicated by hypothermia, which is likely secondary to hypothyroidism secondary to medication noncompliance. Patient was treated with Intravenous Synthroid then changed to oral. Restarted on Risperidone for dementia. Patient to be discharged to rehab.     Vital Signs Last 24 Hrs  T(C): 35.6 (27 Aug 2021 05:09), Max: 35.7 (26 Aug 2021 21:09)  T(F): 96 (27 Aug 2021 05:09), Max: 96.2 (26 Aug 2021 21:09)  HR: 72 (27 Aug 2021 05:09) (72 - 81)  BP: 149/71 (27 Aug 2021 05:09) (145/69 - 189/81)  RR: 16 (27 Aug 2021 05:09) (16 - 18)    US Renal (08.26.21 @ 22:04)   IMPRESSION:  Mild fullness of the right renal collecting system, resolved post urination.    CT Head No Cont (08.22.21 @ 23:23)  IMPRESSION:  No CT evidence for acute intracranial pathology.  Mild microvascular ischemic disease.    Xray Tibia + Fibula 2 Views, Right (08.22.21 @ 22:58)  IMPRESSION:  No acute displaced fracture demonstrated.  If symptoms persist, repeat radiographs and/or CT scan is recommended for further evaluation.    Xray Femur 2 Views, Right (08.22.21 @ 22:58)   IMPRESSION:  No acute displaced fracture demonstrated.  If symptoms persist, repeat radiographs and/or CT scan is recommended for further evaluation.    Xray Pelvis AP only (08.22.21 @ 22:58)   Impression:  No acute osseous abnormality was identified, however limited evaluation of the left hemipelvis secondary to rotation.  Osteopenia.    Xray Chest 1 View- PORTABLE-Urgent (08.22.21 @ 22:58)   Impression:  Mild prominence of the interstitial markings.     88 year old female with a past medical history of HTN, dementia, COPD and Vitamin D deficiency presented to ER with complaints of weakness, trouble ambulating and "worsening dementia". UA was positive for Urine tract infection. Patient was admitted for metabolic encephalopathy secondary to UTI. You were treated with Ceftriaxone. Urine cultures revealed Gram Negative Rods - Klebsiella and sensitive to Ceftriaxone. Patient's condition was improving. Patient will be discharged on oral Antibiotics. Patient's hospital course was complicated by hypothermia, which is likely secondary to hypothyroidism secondary to medication noncompliance. Patient was treated with Intravenous Synthroid then changed to oral. Restarted on Risperidone for dementia. Patient to be discharged to rehab.     Vital Signs Last 24 Hrs  T(C): 35.6 (27 Aug 2021 05:09), Max: 35.7 (26 Aug 2021 21:09)  T(F): 96 (27 Aug 2021 05:09), Max: 96.2 (26 Aug 2021 21:09)  HR: 72 (27 Aug 2021 05:09) (72 - 81)  BP: 149/71 (27 Aug 2021 05:09) (145/69 - 189/81)  RR: 16 (27 Aug 2021 05:09) (16 - 18)    PHYSICAL EXAM  GEN: NAD, Resting comfortably in bed  PULM: Clear to auscultation bilaterally, No wheezing  CVS: Regular rate and rhythm  ABD: Soft, non-tender, non-distended, no guarding  EXT: No edema  NEURO: A&Ox3, no focal deficits    US Renal (08.26.21 @ 22:04)   IMPRESSION:  Mild fullness of the right renal collecting system, resolved post urination.    CT Head No Cont (08.22.21 @ 23:23)  IMPRESSION:  No CT evidence for acute intracranial pathology.  Mild microvascular ischemic disease.    Xray Tibia + Fibula 2 Views, Right (08.22.21 @ 22:58)  IMPRESSION:  No acute displaced fracture demonstrated.  If symptoms persist, repeat radiographs and/or CT scan is recommended for further evaluation.    Xray Femur 2 Views, Right (08.22.21 @ 22:58)   IMPRESSION:  No acute displaced fracture demonstrated.  If symptoms persist, repeat radiographs and/or CT scan is recommended for further evaluation.    Xray Pelvis AP only (08.22.21 @ 22:58)   Impression:  No acute osseous abnormality was identified, however limited evaluation of the left hemipelvis secondary to rotation.  Osteopenia.    Xray Chest 1 View- PORTABLE-Urgent (08.22.21 @ 22:58)   Impression:  Mild prominence of the interstitial markings.      Diagnosis    #metabolic encephalopathy-secondary to UTI- improved  #UTI - GNR -klebsiella- sn ceftriaxone , patient treated with 5 days of IV ceftriaxone. continue 2 more days of augmentin to finish 7 day course    #hypothermia due to hypothyroidism - noncompliance with med  patient was treated IV synthroid; started back on PO Ltx 125 mcg  Follow up with endocrinology and PCP    #dementia at baseline- continue risperidone 0.5 mg BID

## 2021-08-28 LAB
CULTURE RESULTS: SIGNIFICANT CHANGE UP
CULTURE RESULTS: SIGNIFICANT CHANGE UP
SPECIMEN SOURCE: SIGNIFICANT CHANGE UP
SPECIMEN SOURCE: SIGNIFICANT CHANGE UP

## 2021-08-31 DIAGNOSIS — I48.91 UNSPECIFIED ATRIAL FIBRILLATION: ICD-10-CM

## 2021-08-31 DIAGNOSIS — N39.0 URINARY TRACT INFECTION, SITE NOT SPECIFIED: ICD-10-CM

## 2021-08-31 DIAGNOSIS — Z91.81 HISTORY OF FALLING: ICD-10-CM

## 2021-08-31 DIAGNOSIS — G93.41 METABOLIC ENCEPHALOPATHY: ICD-10-CM

## 2021-08-31 DIAGNOSIS — B96.1 KLEBSIELLA PNEUMONIAE [K. PNEUMONIAE] AS THE CAUSE OF DISEASES CLASSIFIED ELSEWHERE: ICD-10-CM

## 2021-08-31 DIAGNOSIS — Z90.49 ACQUIRED ABSENCE OF OTHER SPECIFIED PARTS OF DIGESTIVE TRACT: ICD-10-CM

## 2021-08-31 DIAGNOSIS — E55.9 VITAMIN D DEFICIENCY, UNSPECIFIED: ICD-10-CM

## 2021-08-31 DIAGNOSIS — Z85.828 PERSONAL HISTORY OF OTHER MALIGNANT NEOPLASM OF SKIN: ICD-10-CM

## 2021-08-31 DIAGNOSIS — E86.0 DEHYDRATION: ICD-10-CM

## 2021-08-31 DIAGNOSIS — Z90.710 ACQUIRED ABSENCE OF BOTH CERVIX AND UTERUS: ICD-10-CM

## 2021-08-31 DIAGNOSIS — Z87.440 PERSONAL HISTORY OF URINARY (TRACT) INFECTIONS: ICD-10-CM

## 2021-08-31 DIAGNOSIS — F03.90 UNSPECIFIED DEMENTIA, UNSPECIFIED SEVERITY, WITHOUT BEHAVIORAL DISTURBANCE, PSYCHOTIC DISTURBANCE, MOOD DISTURBANCE, AND ANXIETY: ICD-10-CM

## 2021-08-31 DIAGNOSIS — R41.0 DISORIENTATION, UNSPECIFIED: ICD-10-CM

## 2021-08-31 DIAGNOSIS — Z88.6 ALLERGY STATUS TO ANALGESIC AGENT: ICD-10-CM

## 2021-08-31 DIAGNOSIS — J44.9 CHRONIC OBSTRUCTIVE PULMONARY DISEASE, UNSPECIFIED: ICD-10-CM

## 2021-08-31 DIAGNOSIS — I10 ESSENTIAL (PRIMARY) HYPERTENSION: ICD-10-CM

## 2021-08-31 DIAGNOSIS — Z91.128 PATIENT'S INTENTIONAL UNDERDOSING OF MEDICATION REGIMEN FOR OTHER REASON: ICD-10-CM

## 2021-08-31 DIAGNOSIS — E03.9 HYPOTHYROIDISM, UNSPECIFIED: ICD-10-CM

## 2022-10-28 NOTE — ED ADULT TRIAGE NOTE - HEIGHT IN CM
Pt ok for discharge per MD. IV removed. Tele removed, [facility] notified. Pt's belongings sent with pt. Transported by [facility] x two attendants via stretcher to [facility]. [phone]

## 2023-06-19 NOTE — PATIENT PROFILE ADULT - DO YOU FEEL UNSAFE AT HOME, WORK, OR SCHOOL?
06/19/23                            Alda Anderson  [id number]  [place] [address]    To Whom It May Concern:    This is to certify Alda Anderson was evaluated with Josie Chaves PA-C on 06/19/23 and is excused from work on 06/19/2023 .     RESTRICTIONS:             Electronically signed by:  Josie Chaves PA-C  [facility]  [address] [facility] [address]  Dept Phone: [phone]       
no

## 2023-09-18 NOTE — DIETITIAN INITIAL EVALUATION ADULT. - SIGNS/SYMPTOMS
COVID Screening completed. Patient denies complaints, no changes to PMH or medications. Patient tolerates exercise well. Weekly education: label reading, how to read a label, DASH Diet, handout offered. Patient introduced to Recumbent Elliptical with good tolerance.   Electronically signed by Alejandra Mondragon RN on 9/18/2023 at 3:41 PM B/L buttock stage II

## 2024-03-15 NOTE — H&P ADULT - NSHPLABSRESULTS_GEN_ALL_CORE
pertinent negatives as per HPI, otherwise negative ROS.    Physical Exam and Vital Signs   Vital Signs - Reviewed the patient's vital signs.    Vitals:    03/15/24 0400 03/15/24 0415 03/15/24 0430 03/15/24 0445   BP: 107/66 106/67 104/70 109/69   Pulse: 80 81 80 85   Resp: 10 13 13 15   Temp:       TempSrc:       SpO2: 97% 98% 97% 96%   Weight:       Height:         Physical Exam:    GENERAL: awake, alert, cooperative, not in distress  HEENT:  EYE EXAM  * Pupils equal, round, and reactive  * No proptosis  * Gaze conjugate, no nystagmus, cornea clear  * No ciliary flushing  * No conjunctival injection  * Extraocular movements intact  * No hyphema or hypopyon  * No subconjunctival bleeds  * Visual acuity 20/20  * Head atraumatic  CV:  * audible heart sounds  * warm and perfused extremities bilaterally  PULMONARY: Good air movement, no wheezes, no crackles  ABDOMEN/: soft, no distension, no guarding, no abdominal tenderness, ross negative, Mcburney negative, Rovsig negative, no masses, no CVA tenderness.  EXTREMITIES/BACK: warm and perfused, no tenderness, no edema, no signs of DVT (warmth, asymmetric swelling, erythema, or calf tenderness).  SKIN: no rashes or signs of trauma  NEURO:   * GCS = 15 (E=4, V=5, M=6)  * Awake, alert, oriented x 3, normal speech  * CNs II-XII intact  * Strength 5/5 in all extremities  * Sensory exam grossly normal  * No pronator drift or dysmetria  * Gaze conjugate, no nystagmus  * Gait normal without instability  * NIHSS 0    Medical Decision Making     Patient is a 32 y.o. female presenting for a headache. Vitals reveal no significant abnormalities and physical exam reveals no significant abnormalities. Based on the history, physical exam, risk factors, and vital signs, I favor the following differential diagnoses: tension headache, migraine headache, cluster headache, simple primary headache. Differentials may also include caffeine withdrawal headache and nicotine withdrawal headache  < from: CT Cervical Spine No Cont (11.26.20 @ 19:52) >    IMPRESSION:    No evidence of acute cervical spine fracture or subluxation.    Multilevel degenerative changes as above.    < end of copied text >    < from: CT Head No Cont (11.26.20 @ 19:51) >    IMPRESSION:    No evidence of acute midline shift, mass effect or intracranial hemorrhage.    < end of copied text >    < from: Xray Hip 2-3 Views, Left (11.26.20 @ 20:32) >    IMPRESSION:    No evidence of acute fracture or dislocation.    Degenerative changes to the bilateral hip joints, pubic symphysis and visible portions of the lumbar spine.    < end of copied text >

## 2025-03-19 NOTE — ED PROVIDER NOTE - CLINICAL SUMMARY MEDICAL DECISION MAKING FREE TEXT BOX
intact 88 female here for UTI with delirium at home. Had screening labs imaging medications and reevaluation, plan is for inpatient admission for continued management. Found to be hypothermic in ED, given stress doses of steroids & thyroxine given prior history.